# Patient Record
Sex: FEMALE | Race: ASIAN | NOT HISPANIC OR LATINO | URBAN - METROPOLITAN AREA
[De-identification: names, ages, dates, MRNs, and addresses within clinical notes are randomized per-mention and may not be internally consistent; named-entity substitution may affect disease eponyms.]

---

## 2018-04-05 ENCOUNTER — OFFICE VISIT - HEALTHEAST (OUTPATIENT)
Dept: FAMILY MEDICINE | Facility: CLINIC | Age: 73
End: 2018-04-05

## 2018-04-05 DIAGNOSIS — R60.0 LOWER EXTREMITY EDEMA: ICD-10-CM

## 2018-04-05 DIAGNOSIS — T78.40XD ALLERGIC REACTION TO DRUG, SUBSEQUENT ENCOUNTER: ICD-10-CM

## 2018-07-11 ENCOUNTER — AMBULATORY - HEALTHEAST (OUTPATIENT)
Dept: GERIATRICS | Facility: CLINIC | Age: 73
End: 2018-07-11

## 2018-07-12 ENCOUNTER — OFFICE VISIT - HEALTHEAST (OUTPATIENT)
Dept: GERIATRICS | Facility: CLINIC | Age: 73
End: 2018-07-12

## 2018-07-12 DIAGNOSIS — I10 ESSENTIAL HYPERTENSION: ICD-10-CM

## 2018-07-12 DIAGNOSIS — I48.0 PAROXYSMAL ATRIAL FIBRILLATION (H): ICD-10-CM

## 2018-07-12 DIAGNOSIS — N18.6 ESRD ON HEMODIALYSIS (H): ICD-10-CM

## 2018-07-12 DIAGNOSIS — I63.89 CEREBROVASCULAR ACCIDENT (CVA) DUE TO OTHER MECHANISM (H): ICD-10-CM

## 2018-07-12 DIAGNOSIS — Z99.2 ESRD ON HEMODIALYSIS (H): ICD-10-CM

## 2018-07-13 ENCOUNTER — COMMUNICATION - HEALTHEAST (OUTPATIENT)
Dept: GERIATRICS | Facility: CLINIC | Age: 73
End: 2018-07-13

## 2018-07-16 ENCOUNTER — COMMUNICATION - HEALTHEAST (OUTPATIENT)
Dept: GERIATRICS | Facility: CLINIC | Age: 73
End: 2018-07-16

## 2018-07-17 ENCOUNTER — OFFICE VISIT - HEALTHEAST (OUTPATIENT)
Dept: GERIATRICS | Facility: CLINIC | Age: 73
End: 2018-07-17

## 2018-07-17 DIAGNOSIS — I25.10 CAD (CORONARY ARTERY DISEASE): ICD-10-CM

## 2018-07-17 DIAGNOSIS — Z86.73 STATUS POST CVA: ICD-10-CM

## 2018-07-17 DIAGNOSIS — N18.6 ESRD (END STAGE RENAL DISEASE) (H): ICD-10-CM

## 2018-07-18 ENCOUNTER — AMBULATORY - HEALTHEAST (OUTPATIENT)
Dept: GERIATRICS | Facility: CLINIC | Age: 73
End: 2018-07-18

## 2018-07-19 ENCOUNTER — OFFICE VISIT - HEALTHEAST (OUTPATIENT)
Dept: GERIATRICS | Facility: CLINIC | Age: 73
End: 2018-07-19

## 2018-07-19 DIAGNOSIS — Z86.73 STATUS POST CVA: ICD-10-CM

## 2018-07-19 DIAGNOSIS — N18.6 ESRD (END STAGE RENAL DISEASE) (H): ICD-10-CM

## 2018-07-19 DIAGNOSIS — I25.10 CAD (CORONARY ARTERY DISEASE): ICD-10-CM

## 2018-07-24 ENCOUNTER — OFFICE VISIT - HEALTHEAST (OUTPATIENT)
Dept: GERIATRICS | Facility: CLINIC | Age: 73
End: 2018-07-24

## 2018-07-24 DIAGNOSIS — Z86.73 STATUS POST CVA: ICD-10-CM

## 2018-07-24 DIAGNOSIS — I25.10 CAD (CORONARY ARTERY DISEASE): ICD-10-CM

## 2018-07-24 DIAGNOSIS — N18.6 ESRD (END STAGE RENAL DISEASE) (H): ICD-10-CM

## 2018-07-26 ENCOUNTER — OFFICE VISIT - HEALTHEAST (OUTPATIENT)
Dept: GERIATRICS | Facility: CLINIC | Age: 73
End: 2018-07-26

## 2018-07-26 DIAGNOSIS — Z86.73 STATUS POST CVA: ICD-10-CM

## 2018-07-26 DIAGNOSIS — N18.6 ESRD (END STAGE RENAL DISEASE) (H): ICD-10-CM

## 2018-07-26 DIAGNOSIS — I10 ESSENTIAL HYPERTENSION: ICD-10-CM

## 2018-07-26 DIAGNOSIS — I25.10 CAD (CORONARY ARTERY DISEASE): ICD-10-CM

## 2018-07-31 ENCOUNTER — OFFICE VISIT - HEALTHEAST (OUTPATIENT)
Dept: GERIATRICS | Facility: CLINIC | Age: 73
End: 2018-07-31

## 2018-07-31 DIAGNOSIS — N18.6 ESRD ON HEMODIALYSIS (H): ICD-10-CM

## 2018-07-31 DIAGNOSIS — Z99.2 ESRD ON HEMODIALYSIS (H): ICD-10-CM

## 2018-07-31 DIAGNOSIS — I10 ESSENTIAL HYPERTENSION: ICD-10-CM

## 2018-07-31 DIAGNOSIS — I25.10 CAD (CORONARY ARTERY DISEASE): ICD-10-CM

## 2018-07-31 DIAGNOSIS — Z86.73 STATUS POST CVA: ICD-10-CM

## 2018-07-31 DIAGNOSIS — N18.6 ESRD (END STAGE RENAL DISEASE) (H): ICD-10-CM

## 2018-08-02 ENCOUNTER — OFFICE VISIT - HEALTHEAST (OUTPATIENT)
Dept: GERIATRICS | Facility: CLINIC | Age: 73
End: 2018-08-02

## 2018-08-02 DIAGNOSIS — I10 ESSENTIAL HYPERTENSION: ICD-10-CM

## 2018-08-02 DIAGNOSIS — Z86.73 STATUS POST CVA: ICD-10-CM

## 2018-08-02 DIAGNOSIS — N18.6 ESRD (END STAGE RENAL DISEASE) (H): ICD-10-CM

## 2018-08-02 DIAGNOSIS — I25.10 CAD (CORONARY ARTERY DISEASE): ICD-10-CM

## 2018-08-03 ENCOUNTER — AMBULATORY - HEALTHEAST (OUTPATIENT)
Dept: GERIATRICS | Facility: CLINIC | Age: 73
End: 2018-08-03

## 2019-02-01 ENCOUNTER — OFFICE VISIT - HEALTHEAST (OUTPATIENT)
Dept: GERIATRICS | Facility: CLINIC | Age: 74
End: 2019-02-01

## 2019-02-01 DIAGNOSIS — I73.9 PAD (PERIPHERAL ARTERY DISEASE) (H): ICD-10-CM

## 2019-02-01 DIAGNOSIS — I48.0 PAROXYSMAL ATRIAL FIBRILLATION (H): ICD-10-CM

## 2019-02-01 DIAGNOSIS — N18.6 ESRD ON HEMODIALYSIS (H): ICD-10-CM

## 2019-02-01 DIAGNOSIS — E11.29 TYPE 2 DIABETES MELLITUS WITH OTHER DIABETIC KIDNEY COMPLICATION, WITHOUT LONG-TERM CURRENT USE OF INSULIN (H): ICD-10-CM

## 2019-02-01 DIAGNOSIS — Z99.2 ESRD ON HEMODIALYSIS (H): ICD-10-CM

## 2019-02-03 ENCOUNTER — AMBULATORY - HEALTHEAST (OUTPATIENT)
Dept: ADMINISTRATIVE | Facility: CLINIC | Age: 74
End: 2019-02-03

## 2019-02-03 RX ORDER — POLYETHYLENE GLYCOL 3350 17 G/17G
17 POWDER, FOR SOLUTION ORAL DAILY PRN
Status: SHIPPED | COMMUNITY
Start: 2019-02-03

## 2019-02-03 RX ORDER — CLOPIDOGREL BISULFATE 75 MG/1
75 TABLET ORAL DAILY
Status: SHIPPED | COMMUNITY
Start: 2019-02-03

## 2019-02-03 RX ORDER — SEVELAMER CARBONATE 800 MG/1
800 TABLET, FILM COATED ORAL
Status: SHIPPED | COMMUNITY
Start: 2019-02-03

## 2019-02-03 RX ORDER — ACETAMINOPHEN 325 MG/1
650 TABLET ORAL EVERY 6 HOURS PRN
Status: SHIPPED | COMMUNITY
Start: 2019-02-03

## 2019-02-05 ENCOUNTER — OFFICE VISIT - HEALTHEAST (OUTPATIENT)
Dept: GERIATRICS | Facility: CLINIC | Age: 74
End: 2019-02-05

## 2019-02-05 DIAGNOSIS — I25.10 CORONARY ARTERY DISEASE DUE TO LIPID RICH PLAQUE: ICD-10-CM

## 2019-02-05 DIAGNOSIS — I25.83 CORONARY ARTERY DISEASE DUE TO LIPID RICH PLAQUE: ICD-10-CM

## 2019-02-05 DIAGNOSIS — Z99.2 ESRD ON HEMODIALYSIS (H): ICD-10-CM

## 2019-02-05 DIAGNOSIS — I73.9 PAD (PERIPHERAL ARTERY DISEASE) (H): ICD-10-CM

## 2019-02-05 DIAGNOSIS — N18.6 ESRD ON HEMODIALYSIS (H): ICD-10-CM

## 2019-02-06 ENCOUNTER — OFFICE VISIT - HEALTHEAST (OUTPATIENT)
Dept: GERIATRICS | Facility: CLINIC | Age: 74
End: 2019-02-06

## 2019-02-06 DIAGNOSIS — I73.9 PAD (PERIPHERAL ARTERY DISEASE) (H): ICD-10-CM

## 2019-02-06 DIAGNOSIS — N18.6 ESRD ON HEMODIALYSIS (H): ICD-10-CM

## 2019-02-06 DIAGNOSIS — Z99.2 ESRD ON HEMODIALYSIS (H): ICD-10-CM

## 2019-02-06 DIAGNOSIS — I48.0 PAROXYSMAL ATRIAL FIBRILLATION (H): ICD-10-CM

## 2019-02-07 ENCOUNTER — OFFICE VISIT - HEALTHEAST (OUTPATIENT)
Dept: GERIATRICS | Facility: CLINIC | Age: 74
End: 2019-02-07

## 2019-02-07 DIAGNOSIS — I10 ESSENTIAL HYPERTENSION: ICD-10-CM

## 2019-02-07 DIAGNOSIS — I73.9 PAD (PERIPHERAL ARTERY DISEASE) (H): ICD-10-CM

## 2019-02-07 DIAGNOSIS — N18.6 ESRD (END STAGE RENAL DISEASE) (H): ICD-10-CM

## 2019-02-08 ENCOUNTER — OFFICE VISIT - HEALTHEAST (OUTPATIENT)
Dept: GERIATRICS | Facility: CLINIC | Age: 74
End: 2019-02-08

## 2019-02-08 DIAGNOSIS — I48.0 PAROXYSMAL ATRIAL FIBRILLATION (H): ICD-10-CM

## 2019-02-11 ENCOUNTER — OFFICE VISIT - HEALTHEAST (OUTPATIENT)
Dept: GERIATRICS | Facility: CLINIC | Age: 74
End: 2019-02-11

## 2019-02-11 DIAGNOSIS — I73.9 PAD (PERIPHERAL ARTERY DISEASE) (H): ICD-10-CM

## 2019-02-11 DIAGNOSIS — R11.0 NAUSEA: ICD-10-CM

## 2019-02-11 DIAGNOSIS — I48.0 PAROXYSMAL ATRIAL FIBRILLATION (H): ICD-10-CM

## 2019-02-12 ENCOUNTER — OFFICE VISIT - HEALTHEAST (OUTPATIENT)
Dept: GERIATRICS | Facility: CLINIC | Age: 74
End: 2019-02-12

## 2019-02-12 DIAGNOSIS — I99.8 ISCHEMIA OF FOOT: ICD-10-CM

## 2019-02-12 DIAGNOSIS — N18.6 ESRD (END STAGE RENAL DISEASE) (H): ICD-10-CM

## 2019-02-12 DIAGNOSIS — R11.0 NAUSEA: ICD-10-CM

## 2019-02-15 ENCOUNTER — OFFICE VISIT - HEALTHEAST (OUTPATIENT)
Dept: GERIATRICS | Facility: CLINIC | Age: 74
End: 2019-02-15

## 2019-02-15 DIAGNOSIS — I48.0 PAROXYSMAL ATRIAL FIBRILLATION (H): ICD-10-CM

## 2019-02-15 DIAGNOSIS — I73.9 PAD (PERIPHERAL ARTERY DISEASE) (H): ICD-10-CM

## 2019-02-19 ENCOUNTER — OFFICE VISIT - HEALTHEAST (OUTPATIENT)
Dept: GERIATRICS | Facility: CLINIC | Age: 74
End: 2019-02-19

## 2019-02-19 DIAGNOSIS — Z99.2 ESRD ON HEMODIALYSIS (H): ICD-10-CM

## 2019-02-19 DIAGNOSIS — I73.9 PAD (PERIPHERAL ARTERY DISEASE) (H): ICD-10-CM

## 2019-02-19 DIAGNOSIS — I48.0 PAROXYSMAL ATRIAL FIBRILLATION (H): ICD-10-CM

## 2019-02-19 DIAGNOSIS — N18.6 ESRD ON HEMODIALYSIS (H): ICD-10-CM

## 2019-02-19 DIAGNOSIS — I99.8 ISCHEMIA OF FOOT: ICD-10-CM

## 2019-02-21 ENCOUNTER — OFFICE VISIT - HEALTHEAST (OUTPATIENT)
Dept: GERIATRICS | Facility: CLINIC | Age: 74
End: 2019-02-21

## 2019-02-21 DIAGNOSIS — N18.6 ESRD ON HEMODIALYSIS (H): ICD-10-CM

## 2019-02-21 DIAGNOSIS — Z99.2 ESRD ON HEMODIALYSIS (H): ICD-10-CM

## 2019-02-21 DIAGNOSIS — I99.8 ISCHEMIA OF FOOT: ICD-10-CM

## 2019-02-21 DIAGNOSIS — I48.0 PAROXYSMAL ATRIAL FIBRILLATION (H): ICD-10-CM

## 2019-02-22 ENCOUNTER — OFFICE VISIT - HEALTHEAST (OUTPATIENT)
Dept: GERIATRICS | Facility: CLINIC | Age: 74
End: 2019-02-22

## 2019-02-22 DIAGNOSIS — I73.9 PAD (PERIPHERAL ARTERY DISEASE) (H): ICD-10-CM

## 2019-02-22 DIAGNOSIS — I48.0 PAROXYSMAL ATRIAL FIBRILLATION (H): ICD-10-CM

## 2019-02-26 ENCOUNTER — OFFICE VISIT - HEALTHEAST (OUTPATIENT)
Dept: GERIATRICS | Facility: CLINIC | Age: 74
End: 2019-02-26

## 2019-02-26 DIAGNOSIS — I48.0 PAROXYSMAL ATRIAL FIBRILLATION (H): ICD-10-CM

## 2019-02-26 DIAGNOSIS — N18.6 ESRD (END STAGE RENAL DISEASE) (H): ICD-10-CM

## 2019-02-26 DIAGNOSIS — I73.9 PAD (PERIPHERAL ARTERY DISEASE) (H): ICD-10-CM

## 2019-02-28 ENCOUNTER — OFFICE VISIT - HEALTHEAST (OUTPATIENT)
Dept: GERIATRICS | Facility: CLINIC | Age: 74
End: 2019-02-28

## 2019-02-28 DIAGNOSIS — N18.6 ESRD ON HEMODIALYSIS (H): ICD-10-CM

## 2019-02-28 DIAGNOSIS — I48.0 PAROXYSMAL ATRIAL FIBRILLATION (H): ICD-10-CM

## 2019-02-28 DIAGNOSIS — I99.8 ISCHEMIA OF FOOT: ICD-10-CM

## 2019-02-28 DIAGNOSIS — Z99.2 ESRD ON HEMODIALYSIS (H): ICD-10-CM

## 2019-03-05 ENCOUNTER — OFFICE VISIT - HEALTHEAST (OUTPATIENT)
Dept: GERIATRICS | Facility: CLINIC | Age: 74
End: 2019-03-05

## 2019-03-05 DIAGNOSIS — I73.9 PAD (PERIPHERAL ARTERY DISEASE) (H): ICD-10-CM

## 2019-03-05 DIAGNOSIS — I48.0 PAROXYSMAL ATRIAL FIBRILLATION (H): ICD-10-CM

## 2019-03-05 DIAGNOSIS — I25.83 CORONARY ARTERY DISEASE DUE TO LIPID RICH PLAQUE: ICD-10-CM

## 2019-03-05 DIAGNOSIS — I25.10 CORONARY ARTERY DISEASE DUE TO LIPID RICH PLAQUE: ICD-10-CM

## 2019-03-05 DIAGNOSIS — N18.6 ESRD (END STAGE RENAL DISEASE) (H): ICD-10-CM

## 2019-03-07 ENCOUNTER — OFFICE VISIT - HEALTHEAST (OUTPATIENT)
Dept: GERIATRICS | Facility: CLINIC | Age: 74
End: 2019-03-07

## 2019-03-07 DIAGNOSIS — I25.83 CORONARY ARTERY DISEASE DUE TO LIPID RICH PLAQUE: ICD-10-CM

## 2019-03-07 DIAGNOSIS — N18.6 ESRD ON HEMODIALYSIS (H): ICD-10-CM

## 2019-03-07 DIAGNOSIS — I25.10 CORONARY ARTERY DISEASE DUE TO LIPID RICH PLAQUE: ICD-10-CM

## 2019-03-07 DIAGNOSIS — Z99.2 ESRD ON HEMODIALYSIS (H): ICD-10-CM

## 2019-03-07 DIAGNOSIS — I73.9 PAD (PERIPHERAL ARTERY DISEASE) (H): ICD-10-CM

## 2019-03-08 ENCOUNTER — OFFICE VISIT - HEALTHEAST (OUTPATIENT)
Dept: GERIATRICS | Facility: CLINIC | Age: 74
End: 2019-03-08

## 2019-03-08 DIAGNOSIS — N18.6 ESRD ON HEMODIALYSIS (H): ICD-10-CM

## 2019-03-08 DIAGNOSIS — I73.9 PAD (PERIPHERAL ARTERY DISEASE) (H): ICD-10-CM

## 2019-03-08 DIAGNOSIS — I48.0 PAROXYSMAL ATRIAL FIBRILLATION (H): ICD-10-CM

## 2019-03-08 DIAGNOSIS — Z99.2 ESRD ON HEMODIALYSIS (H): ICD-10-CM

## 2019-03-12 ENCOUNTER — OFFICE VISIT - HEALTHEAST (OUTPATIENT)
Dept: GERIATRICS | Facility: CLINIC | Age: 74
End: 2019-03-12

## 2019-03-12 DIAGNOSIS — Z99.2 ESRD ON HEMODIALYSIS (H): ICD-10-CM

## 2019-03-12 DIAGNOSIS — I48.0 PAROXYSMAL ATRIAL FIBRILLATION (H): ICD-10-CM

## 2019-03-12 DIAGNOSIS — I73.9 PAD (PERIPHERAL ARTERY DISEASE) (H): ICD-10-CM

## 2019-03-12 DIAGNOSIS — N18.6 ESRD ON HEMODIALYSIS (H): ICD-10-CM

## 2019-03-13 ENCOUNTER — OFFICE VISIT - HEALTHEAST (OUTPATIENT)
Dept: GERIATRICS | Facility: CLINIC | Age: 74
End: 2019-03-13

## 2019-03-13 DIAGNOSIS — I73.9 PAD (PERIPHERAL ARTERY DISEASE) (H): ICD-10-CM

## 2019-03-13 DIAGNOSIS — I10 ESSENTIAL HYPERTENSION: ICD-10-CM

## 2019-03-13 DIAGNOSIS — I48.0 PAROXYSMAL ATRIAL FIBRILLATION (H): ICD-10-CM

## 2019-03-14 ENCOUNTER — OFFICE VISIT - HEALTHEAST (OUTPATIENT)
Dept: GERIATRICS | Facility: CLINIC | Age: 74
End: 2019-03-14

## 2019-03-14 DIAGNOSIS — I73.9 PAD (PERIPHERAL ARTERY DISEASE) (H): ICD-10-CM

## 2019-03-14 DIAGNOSIS — I99.8 ISCHEMIA OF FOOT: ICD-10-CM

## 2019-03-14 DIAGNOSIS — N18.6 ESRD ON HEMODIALYSIS (H): ICD-10-CM

## 2019-03-14 DIAGNOSIS — Z99.2 ESRD ON HEMODIALYSIS (H): ICD-10-CM

## 2019-03-15 ENCOUNTER — OFFICE VISIT - HEALTHEAST (OUTPATIENT)
Dept: GERIATRICS | Facility: CLINIC | Age: 74
End: 2019-03-15

## 2019-03-15 DIAGNOSIS — I48.0 PAROXYSMAL ATRIAL FIBRILLATION (H): ICD-10-CM

## 2019-03-15 DIAGNOSIS — I73.9 PAD (PERIPHERAL ARTERY DISEASE) (H): ICD-10-CM

## 2019-03-15 DIAGNOSIS — R11.0 NAUSEA: ICD-10-CM

## 2019-03-18 ENCOUNTER — OFFICE VISIT - HEALTHEAST (OUTPATIENT)
Dept: GERIATRICS | Facility: CLINIC | Age: 74
End: 2019-03-18

## 2019-03-18 DIAGNOSIS — I73.9 PAD (PERIPHERAL ARTERY DISEASE) (H): ICD-10-CM

## 2019-03-18 DIAGNOSIS — I48.0 PAROXYSMAL ATRIAL FIBRILLATION (H): ICD-10-CM

## 2019-03-19 ENCOUNTER — OFFICE VISIT - HEALTHEAST (OUTPATIENT)
Dept: GERIATRICS | Facility: CLINIC | Age: 74
End: 2019-03-19

## 2019-03-19 DIAGNOSIS — I99.8 ISCHEMIA OF FOOT: ICD-10-CM

## 2019-03-19 DIAGNOSIS — I25.10 CORONARY ARTERY DISEASE DUE TO LIPID RICH PLAQUE: ICD-10-CM

## 2019-03-19 DIAGNOSIS — I73.9 PAD (PERIPHERAL ARTERY DISEASE) (H): ICD-10-CM

## 2019-03-19 DIAGNOSIS — I25.83 CORONARY ARTERY DISEASE DUE TO LIPID RICH PLAQUE: ICD-10-CM

## 2019-03-21 ENCOUNTER — OFFICE VISIT - HEALTHEAST (OUTPATIENT)
Dept: GERIATRICS | Facility: CLINIC | Age: 74
End: 2019-03-21

## 2019-03-21 DIAGNOSIS — I99.8 ISCHEMIA OF FOOT: ICD-10-CM

## 2019-03-21 DIAGNOSIS — I73.9 PAD (PERIPHERAL ARTERY DISEASE) (H): ICD-10-CM

## 2019-03-21 DIAGNOSIS — R11.0 NAUSEA: ICD-10-CM

## 2019-03-22 ENCOUNTER — OFFICE VISIT - HEALTHEAST (OUTPATIENT)
Dept: GERIATRICS | Facility: CLINIC | Age: 74
End: 2019-03-22

## 2019-03-22 DIAGNOSIS — I48.0 PAROXYSMAL ATRIAL FIBRILLATION (H): ICD-10-CM

## 2019-03-26 ENCOUNTER — OFFICE VISIT - HEALTHEAST (OUTPATIENT)
Dept: GERIATRICS | Facility: CLINIC | Age: 74
End: 2019-03-26

## 2019-03-26 DIAGNOSIS — I10 ESSENTIAL HYPERTENSION: ICD-10-CM

## 2019-03-26 DIAGNOSIS — I25.83 CORONARY ARTERY DISEASE DUE TO LIPID RICH PLAQUE: ICD-10-CM

## 2019-03-26 DIAGNOSIS — I99.8 ISCHEMIA OF FOOT: ICD-10-CM

## 2019-03-26 DIAGNOSIS — I25.10 CORONARY ARTERY DISEASE DUE TO LIPID RICH PLAQUE: ICD-10-CM

## 2019-03-26 DIAGNOSIS — I73.9 PAD (PERIPHERAL ARTERY DISEASE) (H): ICD-10-CM

## 2019-03-28 ENCOUNTER — OFFICE VISIT - HEALTHEAST (OUTPATIENT)
Dept: GERIATRICS | Facility: CLINIC | Age: 74
End: 2019-03-28

## 2019-03-28 DIAGNOSIS — N18.6 ESRD ON HEMODIALYSIS (H): ICD-10-CM

## 2019-03-28 DIAGNOSIS — I10 ESSENTIAL HYPERTENSION: ICD-10-CM

## 2019-03-28 DIAGNOSIS — Z99.2 ESRD ON HEMODIALYSIS (H): ICD-10-CM

## 2019-03-28 DIAGNOSIS — I25.83 CORONARY ARTERY DISEASE DUE TO LIPID RICH PLAQUE: ICD-10-CM

## 2019-03-28 DIAGNOSIS — I25.10 CORONARY ARTERY DISEASE DUE TO LIPID RICH PLAQUE: ICD-10-CM

## 2019-04-01 ENCOUNTER — AMBULATORY - HEALTHEAST (OUTPATIENT)
Dept: GERIATRICS | Facility: CLINIC | Age: 74
End: 2019-04-01

## 2021-05-27 NOTE — PROGRESS NOTES
[facility]    Facility:   [facility] [[id number]]   Code Status: POLST AVAILABLE    73-year-old female seen for follow up evaluation and discharge planning. History of end-stage renal disease on hemodialysis, coronary artery disease, cerebral vascular disease, hypertension, peripheral vascular disease, admitted to hospital with ischemic changes left lower extremity, critical arterial insufficiency, has declined further surgical intervention for gangrenous changes, has continued on dialysis three times weekly. Family has agreed to take patient home, patient anticipates continuing dialysis. Currently receiving oxycodone 5 mg Q6 hours PRN and for significant pain related to gangrene/ischemia. Recent prolonged nausea and vomiting, currently receiving Zofran 8 mg TID on a routine basis with resolution of nausea and vomiting, no evidence of acute abdomen. Blood pressure  has been stable. Physical therapy discontinued long ago.    Review of systems: patient is seen with  and sister on this occasion. Ongoing pain left toes, mild pain right distal toes. Denies nausea or vomiting. Appetite adequate. Denies chest pain or palpitations. No fever sweats or chills. Remainder of 12 point review of systems obtained negative.  has no concerns related to patient care.    Exam: vital signs reviewed over past seven days. Hmong speaking, sitting on edge of bed in no apparent distress. Mucous membranes moist. No facial asymmetry. S1 and S2 regular. Pulmonary exam without rales rhonchi or wheezes. Abdomen without masses tenderness organomegaly. Periphery with nonpalpable pedal pulses, blackened toes one through four left foot, minimal soft-tissue swelling forefoot, spotty gangrene right toes. No calf tenderness or swelling.    Impression and plan:   critical vascular arterial insufficiency left greater than right lower extremity, gangrene left toes extensive, patient declines surgical  intervention including amputation, family does not desire patient to have amputation or surgical intervention, they have declined hospice evaluation.   Recent prolonged nausea and vomiting on Zofran 8 mg TID.   Hypertension with adequate control.   End-stage renal disease on hemodialysis, tolerating dialysis.   Cerebral vascular disease without recent neurologic events.   Patient will discharge to home in 24 hours, medications are as follows:  Current Outpatient Medications:      acetaminophen (TYLENOL) 325 MG tablet, Take 650 mg by mouth every 6 (six) hours as needed for pain., Disp: , Rfl:      clopidogrel (PLAVIX) 75 mg tablet, Take 75 mg by mouth daily., Disp: , Rfl:      insulin aspart U-100 (NOVOLOG) 100 unit/mL injection, Inject under the skin 3 (three) times a day with meals. Per sliding scale; =no insulin, 150-199=1 u, 200-249=2 u,250-299=3 u, 300-349=4 u, 350-399=5 u, 400 or greater=6 units, call MD, Disp: , Rfl:      ipratropium-albuterol (DUO-NEB) 0.5-2.5 mg/3 mL nebulizer, Inhale 3 mL every 4 (four) hours as needed., Disp: , Rfl:      metoprolol succinate (TOPROL-XL) 50 MG 24 hr tablet, Take 1 tablet (50 mg total) by mouth daily., Disp: , Rfl: 0     midodrine (PROAMATINE) 5 MG tablet, Take 5 mg by mouth 3 (three) times a day.    , Disp: , Rfl:      polyethylene glycol (MIRALAX) 17 gram packet, Take 17 g by mouth daily as needed., Disp: , Rfl:      senna (SENOKOT) 8.6 mg tablet, Take 1 tablet by mouth 2 (two) times a day., Disp: , Rfl:      sevelamer carbonate (RENVELA) 800 mg tablet, Take 800 mg by mouth 3 (three) times a day before meals. And 800 mg prn take with each snack   , Disp: , Rfl:      warfarin sodium (WARFARIN ORAL), Take by mouth See Admin Instructions.    , Disp: , Rfl:  additional medications oxycodone 5 mg Q6 hours PRN, Zofran 8 mg TID . Patient will be homebound and will require registered nurse and physical therapy. Total time of discharge evaluation greater than 30 minutes,  greater than 50% of time spent in coordination of care and counseling, discussion with patient, family members, nursing staff. Follow up will be with regular physician over next two weeks.      Electronically signed by: Millie Farias MD

## 2021-05-27 NOTE — PROGRESS NOTES
[facility]    Facility:   [facility] [[id number]]   Code Status: POLST AVAILABLE    Reassessment of 73-year-old female with gangrenous changes left greater than right foot, critical peripheral arterial disease, has declined surgical intervention, continues in TCU for pain management, management of medical problems which include hypertension, coronary artery disease, cerebral vascular disease, end-stage renal disease on dialysis three times weekly. Recent significant nausea and vomiting, now receiving Zofran 8 mg TID routinely with resolution of symptoms.    Review of systems: limited by Hmong as primary language. Pain foot continues. Tolerating oxycodone. Nausea and vomiting resolved. Denies fever sweats or chills. No anterior chest discomfort. No focal neurologic symptoms. Eating well. Remainder of 12 point review of systems obtained negative.    Social work reports patient will be discharging to home, family has decided they can care for patient in home setting.    Exam: vital signs reviewed over past seven days with satisfactory control of blood pressure. Patient alert, oriented, sitting on edge of bed, states repeatedly she wants to go home. Otherwise in no apparent distress. Mucous membranes moist. Skin turgor intact. No HJR. S1 and S2 regular. Pulmonary exam without rales rhonchi or wheezes. Abdomen without masses tenderness organomegaly. Gangrene extensively left first through fourth toes, dry, trace erythema extending from toes to forefoot, minimal soft-tissue swelling, tender to palpation distal forefoot. Trace ischemic changes right toes. Pedal pulses are nonpalpable.    Impression and plan:   peripheral arterial disease, patient has declined surgery, dry gangrene left toes, pain appears adequately controlled with oxycodone.   Recent prolonged nausea and vomiting without evidence of acute abdomen, continue Zofran 8 mg TID with symptoms controlled.   End-stage renal  disease continuing dialysis, patient has not expressed a desire to discontinue dialysis.   Hypertension with satisfactory control.   Cerebral vascular disease and coronary artery disease without indication of angina or new focal neurologic deficits.   Issues reviewed with patient, nursing staff, social work.      Electronically signed by: Millie Farias MD

## 2021-05-27 NOTE — PROGRESS NOTES
[facility]    Facility:   [facility] [[id number]]   Code Status: POLST AVAILABLE  73-year-old female with prolonged stay in TCU is seen for reevaluation. Original admission to hospital was with atrial fibrillation and RVR, critical ischemia left lower extremity, patient declined surgical intervention, continues in TCU  with gangrene left forefoot/toes, receiving oxycodone for pain management, continues on dialysis for end-stage renal disease, continues anticoagulated for atrial fibrillation. History of hypertension and CVA.    Review of systems: continued nausea, frequency of vomiting has decreased, denies abdominal pain. Pain adequately controlled left foot. No cardiac or pulmonary symptoms. No focal neurologic deficits. Remainder of 12 point review of systems negative but questionably reliable in view of patient's limited English understanding. Nursing staff report patient has increased complaints of nausea, vomiting periodically.    Exam: weight 118.63/19, 126.32/26. Patient appears uncomfortable, food is in place, is not eating. Skin turgor intact. No facial asymmetry. Mucous membranes dry, no pharyngeal erythema. No HJR or cervical adenopathy. S1 and S2 irregular. Pulmonary exam without adventitious sounds. Abdomen without masses tenderness or organomegaly. Periphery without edema, continued blackened toes 2-4 left foot, pedal pulses nonpalpable.    Impression and plan:   critical ischemia left lower extremity, gangrene, no evidence of sepsis.   Persistent nausea and vomiting, Zofran available, no evidence of acute abdomen, continue Zofran as necessary.   Patient has agreed to no further hospitalizations, has declined hospice care, continue to monitor for adequacy of pain control.   End-stage renal disease on hemodialysis, continues dialysis three times weekly.   Diabetes mellitus, adequate control.   Atrial fibrillation with heart rate controlled.   Abnormal weight loss  related to nausea and vomiting and poor appetite, encourage eating as tolerated.   Issues reviewed with patient and nursing staff.         Electronically signed by: Millie Farias MD

## 2021-05-27 NOTE — PROGRESS NOTES
[facility]    Facility:   [facility] [[id number]]   Code Status: POLST AVAILABLE      CHIEF COMPLAINT/REASON FOR VISIT:  Chief Complaint   Patient presents with     Problem Visit     coumadin/INR/pain       HISTORY:      HPI: Graciela is a 73 y.o. female patient with history of diabetes mellitus, hypertension, A. Fib, and end-stage renal disease on hemodialysis; became short of breath and had missed her dialysis appointment because of her foot pain and when she arrived at dialysis for her next appointment she became short of breath with hypoxemia and was transferred via EMS to [facility].  She was in A. fib with RVR but converted to sinus rhythm on her own.  Reportedly scheduled for vascular surgery as an outpatient in 2-4 weeks; she had been previously seen at [facility] 2 weeks prior for foot pain and was treated with antibiotics.    Today: INR 1.7 so will add another 0.5mg dose to the week (4 days a week and none 3 days per week) and recheck on Wednesday. Her INR has been variable as has her po intake. Both nausea and pain are improved per her report today as well as nursing report.     PVD: Possible below ankle amputation; - cancelled per family and patient wishes.    A. fib: On Coumadin.  Also on metoprolol for rate control.    End-stage renal disease: On hemodialysis Monday, Wednesday, Friday.  Does take midodrine with her to appointment to account for hypotension.  On 1500ml  fluid restriction.    DM 2: On sliding scale insulin, blood sugars have been well within normal limits in TCU.        Past Medical History:   Diagnosis Date     Acute hypoxemic respiratory failure (H)      CKD (chronic kidney disease) requiring chronic dialysis (H)      Critical lower limb ischemia      Diabetes mellitus, type II (H)      Essential hypertension      Hyperlipidemia      Influenza A 12/28/2016     PAD (peripheral artery disease) (H)      Paroxysmal atrial fibrillation (H) 12/28/2016      Pericardial cyst 2013     Sepsis (H)              Family History   Problem Relation Age of Onset     Acute Myocardial Infarction Neg Hx      Sudden death Neg Hx      Social History     Socioeconomic History     Marital status:      Spouse name: Not on file     Number of children: 9     Years of education: Not on file     Highest education level: Not on file   Occupational History     Not on file   Social Needs     Financial resource strain: Not on file     Food insecurity:     Worry: Not on file     Inability: Not on file     Transportation needs:     Medical: Not on file     Non-medical: Not on file   Tobacco Use     Smoking status: Never Smoker     Smokeless tobacco: Never Used   Substance and Sexual Activity     Alcohol use: No     Drug use: No     Sexual activity: No   Lifestyle     Physical activity:     Days per week: Not on file     Minutes per session: Not on file     Stress: Not on file   Relationships     Social connections:     Talks on phone: Not on file     Gets together: Not on file     Attends [employer] service: Not on file     Active member of club or organization: Not on file     Attends meetings of clubs or organizations: Not on file     Relationship status: Not on file     Intimate partner violence:     Fear of current or ex partner: Not on file     Emotionally abused: Not on file     Physically abused: Not on file     Forced sexual activity: Not on file   Other Topics Concern     Not on file   Social History Narrative     Not on file         Review of Systems   Constitutional: Positive for appetite change.   HENT: Negative.    Respiratory: Negative.    Cardiovascular: Negative.    Gastrointestinal: Positive for nausea. Negative for vomiting.   Genitourinary: Negative.    Musculoskeletal: Negative.         Vascular foot pain   Skin: Positive for color change.   Psychiatric/Behavioral: Negative.    per nursing.   Able to answer some yes or no questions, per nursing main concern is painful  bilateral lower extremities with darkened skin. Very painful.   .  Vitals:    03/26/19 1459   BP: 138/78   Pulse: 70   Temp: 98  F (36.7  C)   SpO2: 98%   Weight: 115 lb (52.2 kg)       Physical Exam   Constitutional: She is easily aroused.   Cardiovascular: Normal rate.   Pulmonary/Chest: Effort normal and breath sounds normal. No respiratory distress.   Abdominal: She exhibits no distension.   Musculoskeletal: She exhibits no edema.   Neurological: She is alert and easily aroused.   Skin: Skin is warm and dry.   Bottom of feet and toes black, tender, cool.          LABS:   To be completed at dialysis.     ASSESSMENT:      ICD-10-CM    1. Paroxysmal atrial fibrillation (H) I48.0        PLAN:    INR 1.7, give 0.5mg 4 days per week and none 3 days per week; recheck Wednesday.   Now on twice daily dosing for oxycodone for foot pain.   Nausea and pain both in control during this visit. Oxycodone scheduled has helped.       Electronically signed by: Julieta Stewart CNP

## 2021-05-27 NOTE — PROGRESS NOTES
[facility]    Facility:   [facility] [[id number]]   Code Status: POLST AVAILABLE      Reassessment of 73-year-old female with gangrene left toes, severe peripheral arterial disease, has declined surgical intervention including amputation, continues in TCU for management of chronic medical problems which include end-stage renal disease on hemodialysis, hypertension, diabetes mellitus, atrial fibrillation anticoagulated. Recent significant nausea and vomiting reported by nursing staff.    Review of systems: patient with limited English ability,  in attendance, describes nausea much of the time. Zofran moderately beneficial, vomits frequently. Denies abdominal pain. No aspiration. No dyspeptic symptoms. Unaware of other family members with nausea or vomiting, denies unusual intake of foods or products. Fatigue present at all times. Desires to go home. No focal neurologic deficits. Reports pain of foot is adequately controlled. 12 point review of systems is otherwise negative but questionably reliable in view of limited English ability. Nursing staff report patient has repeated episodes of vomiting, Zofran appears beneficial.    Exam: lying in bed, communicates with hand gestures and with minimal English ability. Skin turgor intact. No facial asymmetry. No HJR. S1 and S2 irregular. Pulmonary exam without rales rhonchi or wheezes. No hand drift or tremor. Abdomen with normoactive bowel sounds, no organomegaly, no focal tenderness or withdrawal. Gangrenous changes left second through fourth toes with blackness, previous erythema extending from toes resolved. Pedal pulses nonpalpable. No calf tenderness or swelling.    Impression and plan:   gangrene left foot, ischemic changes right foot minimal, patient has declined surgical intervention, receiving oxycodone for pain management, pain control appears adequate but assessment is limited by English limitation.   New onset nausea  and vomiting, no evidence of acute abdomen, no evidence of acute G.I. distress or sepsis, continue Zofran which is scheduled routinely due to persistent symptoms.   Hypertension with adequate control of blood pressure.   End-stage renal disease, nursing unclear if patient is participating fully in sessions, previously has ended sessions prematurely.   Diabetes mellitus with adequate control.   Issues reviewed with patient nursing staff and  in attendance.  Electronically signed by: Millie Farias MD

## 2021-05-31 ENCOUNTER — RECORDS - HEALTHEAST (OUTPATIENT)
Dept: ADMINISTRATIVE | Facility: CLINIC | Age: 76
End: 2021-05-31

## 2021-06-01 VITALS — WEIGHT: 164 LBS

## 2021-06-02 ENCOUNTER — RECORDS - HEALTHEAST (OUTPATIENT)
Dept: ADMINISTRATIVE | Facility: CLINIC | Age: 76
End: 2021-06-02

## 2021-06-02 VITALS — WEIGHT: 129 LBS | BODY MASS INDEX: 25.19 KG/M2

## 2021-06-02 VITALS — WEIGHT: 118 LBS | BODY MASS INDEX: 23.05 KG/M2

## 2021-06-02 VITALS — BODY MASS INDEX: 24.61 KG/M2 | WEIGHT: 126 LBS

## 2021-06-02 VITALS — BODY MASS INDEX: 22.46 KG/M2 | WEIGHT: 115 LBS

## 2021-06-02 VITALS — WEIGHT: 119 LBS | BODY MASS INDEX: 23.24 KG/M2

## 2021-06-02 VITALS — WEIGHT: 127 LBS | BODY MASS INDEX: 24.8 KG/M2

## 2021-06-02 VITALS — WEIGHT: 128 LBS | BODY MASS INDEX: 25 KG/M2

## 2021-06-02 VITALS — BODY MASS INDEX: 25.19 KG/M2 | WEIGHT: 129 LBS

## 2021-06-02 VITALS — BODY MASS INDEX: 23.05 KG/M2 | WEIGHT: 118 LBS

## 2021-06-03 ENCOUNTER — RECORDS - HEALTHEAST (OUTPATIENT)
Dept: ADMINISTRATIVE | Facility: CLINIC | Age: 76
End: 2021-06-03

## 2021-06-16 PROBLEM — I73.9 PAD (PERIPHERAL ARTERY DISEASE) (H): Status: ACTIVE | Noted: 2018-12-31

## 2021-06-16 PROBLEM — L03.116 CELLULITIS OF LEFT FOOT: Status: ACTIVE | Noted: 2018-12-29

## 2021-06-16 PROBLEM — E78.5 DYSLIPIDEMIA: Chronic | Status: ACTIVE | Noted: 2018-07-03

## 2021-06-16 PROBLEM — R11.0 NAUSEA: Status: ACTIVE | Noted: 2019-02-14

## 2021-06-16 PROBLEM — I63.9 STROKE (H): Status: ACTIVE | Noted: 2018-07-02

## 2021-06-17 NOTE — PROGRESS NOTES
Assessment:       Allergic reaction to a drug  Lower extremity edema      Plan:       Short course of oral steroid per orders  Calamine lotion  OTC hydrocortisone 1% cream discussed  Elevation, compression, cold compresses, and tylenol  Discussed signs of worsening symptoms and when to follow-up with PCP if no symptom improvement.  Discussed patient symptoms and appropriate plan with attended physician Dr. Celestin who agreed with plan.     Patient Instructions   You were seen today for hives, likely due to an allergic reaction. These symptoms are generally benign and will improve with conservative management. Keep a watch for worsening symptoms listed below.     Symptom management:  - Continue to take 24-hour antihistamine once a day while symptoms last  - Cold compresses for 10-15 minutes at a time, up to every hour as needed for swelling and itchiness  - Take oral steroid as instructed  - Calamine lotion may be applied to skin to help with itchiness  - May try hydrocortisone cream 1% if itchiness continues despite other treatments    Reasons to be seen for immediately in the emergency room:  - Fever of 100.4  - Tongue or lips swelling  - Difficulty breathing or swallowing  - Feeling tightness in the throat or chest  - Develop abdominal pain  - Swelling in the leg worsens or spreading redness    Otherwise, if no improvement in symptoms in 3 days, follow-up with the primary care provider       Subjective:        History provided by son.  Graciela Valdivia is a 72 y.o. female here for evaluation of a rash. Symptoms have been present for 1 week and started after the patient applied an unknown topical cream from the flea market to her left foot for foot pain. Patient also attempted to puncture her foot several times with a needle before applying the cream. The rash is located on the left lower extremity. Since then it has spread to the right leg, abdomen, chest, and upper extremities. Patient has tried over the counter loratadine  and steroid topical spray for initial treatment and the rash has not changed. Discomfort is moderate, described as itchy. Patient does not have a fever. Recent illnesses: none. Sick contacts: none known. New recent exposures to food, detergent, soaps: none.     Review of Systems  Pertinent items are noted in HPI    Allergies  No Known Allergies       Objective:       /60  Pulse 76  Temp 98  F (36.7  C) (Oral)   Resp 15  Wt 164 lb (74.4 kg)  SpO2 100%  Breastfeeding? No  BMI 30 kg/m2  General appearance: alert, appears stated age, cooperative, no distress and non-toxic  Extremities: mild edema and tenderness to palpation of left foot and ankle; no trauma  Pulses: 2+ and symmetric  Skin: slightly raised, light, maculopapular rash/hives affecting the legs, abdomen, chest, and arms; normal temperature to palpation; skin intact, no drainage. Left foot is mildly edematous, tender to palpation, and violaceous.

## 2021-06-17 NOTE — PATIENT INSTRUCTIONS - HE
Patient Instructions by Julieta Stewart CNP at 2/15/2019  4:10 PM     Author: Julieta Stewart CNP Service: -- Author Type: Nurse Practitioner    Filed: 2/19/2019 10:28 AM Encounter Date: 2/15/2019 Status: Signed    : Julieta Stewart CNP (Nurse Practitioner)

## 2021-06-18 NOTE — LETTER
Letter by Julieta Stewart CNP at      Author: Julieta Stewart CNP Service: -- Author Type: --    Filed:  Encounter Date: 2/1/2019 Status: (Other)         Patient: Graciela Valdivia   MR Number: [id number]   YOB: 1945   Date of Visit: 2/1/2019     [facility]    Facility:   [facility] [[id number]]   Code Status: POLST AVAILABLE      CHIEF COMPLAINT/REASON FOR VISIT:  Chief Complaint   Patient presents with   ? Review Of Multiple Medical Conditions       HISTORY:      HPI: Graciela is a 73 y.o. female patient with history of diabetes mellitus, hypertension, A. Fib, and end-stage renal disease on hemodialysis; became short of breath and had missed her dialysis appointment because of her foot pain and when she arrived at dialysis for her next appointment she became short of breath with hypoxemia and was transferred via EMS to [facility].  She was in A. fib with RVR but converted to sinus rhythm on her own.  Reportedly scheduled for vascular surgery as an outpatient in 2-4 weeks; she had been previously seen at [facility] 2 weeks prior for foot pain and was treated with antibiotics.    PVD: To be following up with vascular to 4 weeks for outpatient procedure.    A. fib: On Coumadin, most recent INR therapeutic.  Also on metoprolol for rate control.    End-stage renal disease: On hemodialysis Monday, Wednesday, Friday.  Does take midodrine with her to appointment to account for hypertension.  On 1500ml  fluid restriction.    DM 2: On sliding scale insulin, blood sugars have been well within normal limits in TCU.        Past Medical History:   Diagnosis Date   ? Acute hypoxemic respiratory failure (H)    ? CKD (chronic kidney disease) requiring chronic dialysis (H)    ? Critical lower limb ischemia    ? Diabetes mellitus, type II (H)    ? Essential hypertension    ? Hyperlipidemia    ? Influenza A 12/28/2016   ? PAD (peripheral artery disease) (H)    ? Paroxysmal atrial  fibrillation (H) 12/28/2016   ? Pericardial cyst 2013   ? Sepsis (H)              Family History   Problem Relation Age of Onset   ? Acute Myocardial Infarction Neg Hx    ? Sudden death Neg Hx      Social History     Socioeconomic History   ? Marital status:      Spouse name: Not on file   ? Number of children: 9   ? Years of education: Not on file   ? Highest education level: Not on file   Social Needs   ? Financial resource strain: Not on file   ? Food insecurity - worry: Not on file   ? Food insecurity - inability: Not on file   ? Transportation needs - medical: Not on file   ? Transportation needs - non-medical: Not on file   Occupational History   ? Not on file   Tobacco Use   ? Smoking status: Never Smoker   ? Smokeless tobacco: Never Used   Substance and Sexual Activity   ? Alcohol use: No   ? Drug use: No   ? Sexual activity: No   Other Topics Concern   ? Not on file   Social History Narrative   ? Not on file         Review of Systems   Unable to perform ROS: Patient nonverbal   Patient refused ROS, hold her head over her head turned away.    .  Vitals:    02/05/19 0909   BP: 138/62   Pulse: 61   Temp: 98.2  F (36.8  C)   SpO2: 100%   Weight: 127 lb (57.6 kg)       Physical Exam   Constitutional: She appears cachectic. She is uncooperative. She is easily aroused.   Cardiovascular: Normal rate.   Pulmonary/Chest: Effort normal and breath sounds normal. No respiratory distress.   Abdominal: She exhibits no distension.   Musculoskeletal: She exhibits no edema.   Neurological: She is alert and easily aroused.   Skin: Skin is warm and dry.         LABS:   To be completed at dialysis.     ASSESSMENT:      ICD-10-CM    1. ESRD on hemodialysis (H) N18.6     Z99.2    2. PAD (peripheral artery disease) (H) I73.9    3. Paroxysmal atrial fibrillation (H) I48.0    4. Type 2 diabetes mellitus with other diabetic kidney complication, without long-term current use of insulin (H) E11.29        PLAN:    Continue  current orders, no changes to care plan made today.  PT/OT as patient allows.    Admission history and physical per MD in the next week. At this time continue current care plan for all chronic medical conditions, as they are stable. Encouraged patient to engage in PT/OT for strengthening and conditioning. Encouraged patient to work closely with nursing staff to ensure any medical complaints are quickly addressed. Follow up this week or sooner if needed. Will continue to monitor patient and work with nursing staff collaboratively to work toward positive patient outcomes.      Total 25 minutes of which 50% was spent counseling and coordination of care of the above plan with nursing.    Electronically signed by: Julieta Stewart, KATHY

## 2021-06-18 NOTE — LETTER
Letter by Millie Farias MD at      Author: Millie Farias MD Service: -- Author Type: --    Filed:  Encounter Date: 2/12/2019 Status: (Other)         Patient: Graciela Valdivia   MR Number: [id number]   YOB: 1945   Date of Visit: 2/12/2019     [facility]    Facility:   [facility] [[id number]]   Code Status: POLST AVAILABLE       Reassessment of 73-year-old female with end-stage renal disease on hemodialysis, severe ischemia bilateral feet, painful gangrenous areas, admitted to hospital with weakness and pain in feet, had been treated as outpatient for cellulitis, per patient report she declined amputation of the feet, transferred to TCU for rehabilitation, recent initiation of oxycodone for severe pain lower extremities.    Review of systems: patient is seen with her , both patient and  have limited English ability. Cooperative and pleasant, appears uncomfortable. No facial asymmetry. No HJR. S1 and S2 regular. Pulmonary exam without adventitious sounds. Abdomen without masses or tenderness. Right forefoot with gangrenous changes toes, left forefoot from mid section with discoloration, gangrenous changes toes. No calf tenderness or swelling. Exquisite tenderness to touch.    Impression and plan: ischemia bilateral feet, progressive gangrenous changes, pain continues symptomatic, oxycodone beneficial, ultrasound and vascular evaluation today. End-stage renal disease on hemodialysis, tolerating dialysis. Intermittent nausea per nursing report, Zofran available PRN. Recent loose stools resolved, no evidence of acute abdomen, obtain C. difficile should loose stooling resume. Medications reviewed, discussion with nursing.        Electronically signed by: iMllie Farias MD

## 2021-06-18 NOTE — LETTER
Letter by Millie Farias MD at      Author: Millie Farias MD Service: -- Author Type: --    Filed:  Encounter Date: 2019 Status: (Other)         Patient: Graciela Valdivia   MR Number: [id number]   YOB: 1945   Date of Visit: 2019     [facility]    Facility:   [facility] [[id number]]   Code Status: POLST AVAILABLE    Admission recent TCU 73-year-old female. History is taken from hospital notes and from prior knowledge of patient, patient is able to give a limited history, Hmong speaking. Presented to hospital with two-week progressive discomfort bilateral forefeet and toes, treated as outpatient with gabapentin and antibiotic with suspicion of cellulitis, antibiotics discontinued on hospitalization, ultrasound negative for DVT, presumed to have ischemic changes from PAD, per patient report she was told she may need an amputation, declined amputation, transferred to TCU for rehabilitation, management of multiple medical problems. Per PGY2   discharge summary patient's pain improved with renal dialysis and decrease in LE edema.    Past medical history, current medical problem list, drug allergies, current medication list, social history ( non-smoker nondrinker ) reviewed in epic. Code status do not intubate. Family history, patient unaware of what her parents  of, other family members have diabetes mellitus.    Review of systems: significant pain bilateral forefoot region and toes. Pain increasing. Denies chest pain or palpitations. Fatigue present at all times. No active G.I. or  symptoms. Unaware of similar foot pain in past. No new focal neurologic deficits. Remainder of 12 point review of systems obtained negative.    Exam: vital signs reviewed since admission to TCU. Patient Hmong speaking, oriented, cooperative and pleasant. No facial asymmetry. Mucous membranes moist. No HJR or cervical adenopathy. S1 and S2 regular. Pulmonary exam without rales  rhonchi or wheezes. Abdomen without masses or tenderness. Skin turgor intact. Nonpalpable pulses bilateral pedal, ischemic changes forefoot left greater than right with violaceous coloration, bluish discoloration with dependency of feet. Early gangrenous changes right second and fourth toes, spotty gangrenous changes left digits. Painful to palpation. No calf tenderness or swelling.    Impression and plan:   ischemic changes bilateral forefeet, no evidence of embolic phenomena, open areas digits and early gangrenous changes, Silvadene to be applied, no evidence of infection. Pain control appears adequate with current gabapentin dose and tramadol.   End-stage renal disease on hemodialysis to be continued.   Anemia of chronic disease with recent hemoglobin 7.0.   Hypertension with adequate control of blood pressure.   Diabetes mellitus, Accu checks to be followed.   Paroxysmal atrial fibrillation anticoagulated.   Cerebral vascular disease without new neurologic findings or focal neurologic deficit.   Coronary artery disease without indication of angina.  Issues of pain management and management of gangrenous changes reviewed with nursing staff and patient, vascular appointment scheduled, monitor for septic changes. Outside medical records reviewed, medications reviewed, discussion with nursing staff and patient.      Electronically signed by: Millie Farias MD

## 2021-06-18 NOTE — LETTER
Letter by Julieta Stewart CNP at      Author: Julieta Stewart CNP Service: -- Author Type: --    Filed:  Encounter Date: 2/11/2019 Status: (Other)         Patient: Graciela Valdivia   MR Number: [id number]   YOB: 1945   Date of Visit: 2/11/2019     [facility]    Facility:   [facility] [[id number]]   Code Status: POLST AVAILABLE      CHIEF COMPLAINT/REASON FOR VISIT:  Chief Complaint   Patient presents with   ? Problem Visit     INR, nausea        HISTORY:      HPI: Graciela is a 73 y.o. female patient with history of diabetes mellitus, hypertension, A. Fib, and end-stage renal disease on hemodialysis; became short of breath and had missed her dialysis appointment because of her foot pain and when she arrived at dialysis for her next appointment she became short of breath with hypoxemia and was transferred via EMS to [facility].  She was in A. fib with RVR but converted to sinus rhythm on her own.  Reportedly scheduled for vascular surgery as an outpatient in 2-4 weeks; she had been previously seen at [facility] 2 weeks prior for foot pain and was treated with antibiotics.    Today: Patient seen for supra therapeutic INR of 5.6.  We will hold times 2 days and recheck on Wednesday.  Patient is variable in taking her medications with compliance, has refused afternoon meds repeatedly and then began taking them.  Per afternoon nurse, she is also had some nausea with vomiting and spitting.  Patient refuses to answer ROS today.  Social work is working on getting hmong speaking patient to visit with her for company.    PVD: To be following up with vascular 2 to 4 weeks for outpatient procedure.    A. fib: On Coumadin, INR today 5.6; see plan.  Also on metoprolol for rate control.    End-stage renal disease: On hemodialysis Monday, Wednesday, Friday.  Does take midodrine with her to appointment to account for hypotension.  On 1500ml  fluid restriction.    DM 2: On sliding  scale insulin, blood sugars have been well within normal limits in TCU.        Past Medical History:   Diagnosis Date   ? Acute hypoxemic respiratory failure (H)    ? CKD (chronic kidney disease) requiring chronic dialysis (H)    ? Critical lower limb ischemia    ? Diabetes mellitus, type II (H)    ? Essential hypertension    ? Hyperlipidemia    ? Influenza A 12/28/2016   ? PAD (peripheral artery disease) (H)    ? Paroxysmal atrial fibrillation (H) 12/28/2016   ? Pericardial cyst 2013   ? Sepsis (H)              Family History   Problem Relation Age of Onset   ? Acute Myocardial Infarction Neg Hx    ? Sudden death Neg Hx      Social History     Socioeconomic History   ? Marital status:      Spouse name: Not on file   ? Number of children: 9   ? Years of education: Not on file   ? Highest education level: Not on file   Social Needs   ? Financial resource strain: Not on file   ? Food insecurity - worry: Not on file   ? Food insecurity - inability: Not on file   ? Transportation needs - medical: Not on file   ? Transportation needs - non-medical: Not on file   Occupational History   ? Not on file   Tobacco Use   ? Smoking status: Never Smoker   ? Smokeless tobacco: Never Used   Substance and Sexual Activity   ? Alcohol use: No   ? Drug use: No   ? Sexual activity: No   Other Topics Concern   ? Not on file   Social History Narrative   ? Not on file         Review of Systems   Gastrointestinal: Positive for nausea.   per nursing.   Able to answer some yes or no questions, per nursing main concern is painful bilateral lower extremities with darkened skin.  .  Vitals:    02/14/19 1047   BP: 134/60   Pulse: 64   Temp: 98  F (36.7  C)   SpO2: 92%   Weight: 129 lb (58.5 kg)       Physical Exam   Constitutional: She appears cachectic. She is uncooperative. She is easily aroused.   Cardiovascular: Normal rate.   Pulmonary/Chest: Effort normal and breath sounds normal. No respiratory distress.   Abdominal: She exhibits no  distension.   Musculoskeletal: She exhibits no edema.   Neurological: She is alert and easily aroused.   Skin: Skin is warm and dry.   Difficult to examine given her pain, bottom of feet and toes darkened         LABS:   To be completed at dialysis.     ASSESSMENT:      ICD-10-CM    1. Paroxysmal atrial fibrillation (H) I48.0    2. PAD (peripheral artery disease) (H) I73.9    3. Nausea R11.0        PLAN:    INR 5.6; HOLD x 2 days.   Start zofran 4mg odt po q 8 hours prn.   Ensure patient gets midodrine sent with her to hemodialysis.    Electronically signed by: Julieta Stewart, CNP

## 2021-06-18 NOTE — LETTER
Letter by Millie Farias MD at      Author: Millie Farias MD Service: -- Author Type: --    Filed:  Encounter Date: 3/5/2019 Status: (Other)         Patient: Graciela Valdivia   MR Number: [id number]   YOB: 1945   Date of Visit: 3/5/2019     [facility]    Facility:   [facility] [[id number]]   Code Status: POLST AVAILABLE    Reevaluation of 73-year-old female with severe peripheral arterial disease, recent hospitalization hospitalization for ischemia bilateral feet, decline surgical intervention, transferred to TCU for rehabilitation and management of medical problems which include paroxysmal atrial fibrillation, end-stage renal disease on hemodialysis, hypertension. Has continued to decline further surgical intervention, on oxycodone for significant pain bilateral feet.    Review of systems: Hmong speaking, review of systems however appears reliable, minimal English ability. Reports pain is satisfactorily controlled. Denies fever sweats or chills. No chest pain or palpitations. No focal neurologic deficits. Remainder of 12 point review of systems obtained negative. Nursing staff report patient with improved pain control, eating poorly, continues to limit dialysis sessions.    Exam: in bed for of, oriented, drowsy, cooperative. Repeatedly states she wishes to go home. Temperature 97.1, pulse 57, 02 99%. No facial asymmetry. Skin turgor intact. No pharyngeal erythema. No HJR. S1 and S2 regular. Pulmonary exam without rales rhonchi or wheezes. Abdomen without masses tenderness organomegaly. Gangrenous changes left second and fifth toe pronounce, distal foot erythema, violaceous appearance, previous edema resolving. Pedal pulses nonpalpable. No calf tenderness or swelling.    INR greater than eight, on 2/28 INR 1.7 on to milligram.    Impression and plan:   hyper therapeutic INR, no indication of excessive bruising or bleeding, vitamin K 5 mg PO now, repeat INR 24  hours.   Critical ischemia bilateral lower extremities L>R, gangrenous changes of toes, no evidence of infection, patient has agreed to DNR DNI status and no  hospitalization, pain control adequate with oxycodone 5 to 10 mg Q4 hours PRN.   Hypertension with adequate control of blood pressure.   End-stage renal disease on hemodialysis, limiting sessions, has not declined  continued dialysis.   Coronary artery disease without evidence of angina.   Concerns reviewed with patient and nursing staff.      Electronically signed by: Millie Farias MD

## 2021-06-18 NOTE — LETTER
Letter by Millie Farias MD at      Author: Millie Farias MD Service: -- Author Type: --    Filed:  Encounter Date: 2/26/2019 Status: (Other)         Patient: Graciela Valdivia   MR Number: [id number]   YOB: 1945   Date of Visit: 2/26/2019     [facility]    Facility:   [facility] [[id number]]   Code Status: POLST AVAILABLE  Reassessment of 73 year old female residing in TCU  post hospitalization with gangrene bilateral lower extremities, critical peripheral arterial disease. History of end-stage renal disease on hemodialysis, anticoagulated for paroxysmal atrial fibrillation.    Review of systems: patient is seen with  on this occasion. Has unbearable pain bilateral lower extremities, declines surgical intervention, defers to family members who do not wish her to have surgery. Continues dialysis, finds this to be tiresome. Pain poorly controlled. Does not desire medication that will cause excessive fatigue. Agrees to DNR DNI status in view of gangrene lower extremities. Remainder of 12 point review of systems obtained negative. Discussion with social work who have suggested evaluation by Hmong speaking physician, patient declines desire for this evaluation. Discussion with nursing staff regarding pain management and long-term management, discussion with brother-in-law via phone at patient's request, he defers to patient's children to make decision regarding amputation, previously per nursing staff brother-in-law and  do not wish patient to have surgical intervention. Per patient they do not wish to take care of patient should surgery fail. Patient again agreeable to deferring to family members regarding rx of progressive gangrene lower extremities, understands potential of death from non-amputation.    Exam: temperature 97.6, pulse 64, blood pressure 128/81, respiratory 16. Alert, oriented, appears uncomfortable. Among speaking,   present. No facial asymmetry. No pharyngeal erythema. No HJR. S1 and S2 regular. Pulmonary exam without rales rhonchi or wheezes. Abdomen without masses tenderness organomegaly. Gangrenous changes bilateral  toes left greater than right, erythema and purplish discoloration distal forefoot, hypersensitivity to touch, pedal pulses nonpalpable.    INR 1.5, last INR 1.2 and 1 mg Coumadin Monday Wednesday Friday, 0.5 mg Tuesday Thursday, change to 1 mg Coumadin Q day, recheck INR 48 hours.    Impression and plan:   progressive gangrene lower extremities, patient declines surgery, discussion with social work nursing and brother-in-law via phone. Pain poorly controlled, increase oxycodone to 5 mg Q4 hours PRN pain, change to Dilaudid versus morphine should pain progress.   Patient declines hospice intervention.   Patient declines evaluation by [facility] speaking physician.   Patient agrees to DNR DNI status.   End-stage renal disease on hemodialysis, per nursing staff patient cutting sessions short, poorly tolerating sessions, continue at present.   Anticoagulation for paroxysmal atrial fibrillation with subtherapeutic INR and adjustment of Coumadin.   Total time of evaluation greater than 50 minutes, greater than 90% of time spent in direct contact with patient and with discussion regarding progressive gangrene, likelihood of death secondary to gangrene with infection, review of code status, review of medical status, all discussion with  present.        Electronically signed by: Millie Farias MD

## 2021-06-18 NOTE — LETTER
Letter by Julieta Stewart CNP at      Author: Julieta Stewart CNP Service: -- Author Type: --    Filed:  Encounter Date: 2/8/2019 Status: (Other)         Patient: Graciela Valdivia   MR Number: [id number]   YOB: 1945   Date of Visit: 2/8/2019     [facility]    Facility:   [facility] [[id number]]   Code Status: POLST AVAILABLE      CHIEF COMPLAINT/REASON FOR VISIT:  Chief Complaint   Patient presents with   ? Problem Visit       HISTORY:      HPI: Graciela is a 73 y.o. female patient with history of diabetes mellitus, hypertension, A. Fib, and end-stage renal disease on hemodialysis; became short of breath and had missed her dialysis appointment because of her foot pain and when she arrived at dialysis for her next appointment she became short of breath with hypoxemia and was transferred via EMS to [facility].  She was in A. fib with RVR but converted to sinus rhythm on her own.  Reportedly scheduled for vascular surgery as an outpatient in 2-4 weeks; she had been previously seen at [facility] 2 weeks prior for foot pain and was treated with antibiotics.    Today: Patient seen upon return from dialysis; she is alert and makes eye contact; can shake head yes or no. Denies pain. Appears comfortable lying in bed. Family in and out of facility throughout the day. VS and weights reviewed.     PVD: To be following up with vascular 2 to 4 weeks for outpatient procedure.    A. fib: On Coumadin, INR today 3.1; see plan.  Also on metoprolol for rate control.    End-stage renal disease: On hemodialysis Monday, Wednesday, Friday.  Does take midodrine with her to appointment to account for hypotension.  On 1500ml  fluid restriction.    DM 2: On sliding scale insulin, blood sugars have been well within normal limits in TCU.        Past Medical History:   Diagnosis Date   ? Acute hypoxemic respiratory failure (H)    ? CKD (chronic kidney disease) requiring chronic dialysis (H)     ? Critical lower limb ischemia    ? Diabetes mellitus, type II (H)    ? Essential hypertension    ? Hyperlipidemia    ? Influenza A 12/28/2016   ? PAD (peripheral artery disease) (H)    ? Paroxysmal atrial fibrillation (H) 12/28/2016   ? Pericardial cyst 2013   ? Sepsis (H)              Family History   Problem Relation Age of Onset   ? Acute Myocardial Infarction Neg Hx    ? Sudden death Neg Hx      Social History     Socioeconomic History   ? Marital status:      Spouse name: Not on file   ? Number of children: 9   ? Years of education: Not on file   ? Highest education level: Not on file   Social Needs   ? Financial resource strain: Not on file   ? Food insecurity - worry: Not on file   ? Food insecurity - inability: Not on file   ? Transportation needs - medical: Not on file   ? Transportation needs - non-medical: Not on file   Occupational History   ? Not on file   Tobacco Use   ? Smoking status: Never Smoker   ? Smokeless tobacco: Never Used   Substance and Sexual Activity   ? Alcohol use: No   ? Drug use: No   ? Sexual activity: No   Other Topics Concern   ? Not on file   Social History Narrative   ? Not on file         Review of Systems  Able to answer some yes or no questions, per nursing main concern is painful bilateral lower extremities with darkened skin.  .  Vitals:    02/10/19 1513   BP: 156/70   Pulse: 83   Temp: 97.5  F (36.4  C)   SpO2: 97%   Weight: 129 lb (58.5 kg)       Physical Exam   Constitutional: She appears cachectic. She is uncooperative. She is easily aroused.   Cardiovascular: Normal rate.   Pulmonary/Chest: Effort normal and breath sounds normal. No respiratory distress.   Abdominal: She exhibits no distension.   Musculoskeletal: She exhibits no edema.   Neurological: She is alert and easily aroused.   Skin: Skin is warm and dry.   Difficult to examine given her pain, bottom of feet and toes darkened         LABS:   To be completed at dialysis.     ASSESSMENT:      ICD-10-CM     1. Paroxysmal atrial fibrillation (H) I48.0        PLAN:    INR 3.1, coumadin 0.5mg on Friday and Sunday and 1mg Saturday. Recheck Monday.   Ensure patient gets midodrine sent with her to hemodialysis.    Electronically signed by: Julieta Stewart CNP

## 2021-06-18 NOTE — LETTER
Letter by Millie Farias MD at      Author: Millie Farias MD Service: -- Author Type: --    Filed:  Encounter Date: 2/21/2019 Status: (Other)         Patient: Graciela Valdivia   MR Number: [id number]   YOB: 1945   Date of Visit: 2/21/2019     [facility]    Facility:   [facility] [[id number]]   Code Status: POLST AVAILABLE    Assessment of 73-year-old female with severe peripheral vascular disease, gangrenous changes bilateral toes, paroxysmal atrial fibrillation, recent RVR resolved, end-stage renal disease on hemodialysis, failed vascular intervention left lower extremity, transferred to TCU for rehabilitation, management of medical problems. Severe left foot pain initially in TCU stay, oxycodone initiated with improvement in pain, originally declined amputation in hospital, there after agreed to amputation, now again declining amputation, per nursing staff and social work elder male family members insistent that patient does not have surgery. Recent evaluation by psychology, no change in recommendations other then to  discuss status with family members.    Review of systems: denies active foot pain. States she wishes to go home. Fatigue present at all times. Recent loose stooling resolved. No chest pain or palpitations. No focal neurologic deficits. Remainder of 12 point review of systems obtained negative.    Exam: vital signs reviewed over past 48 hours. Alert, agitated, cooperative. Mood depressed. Hmong speaking, unable to understand some English. No pharyngeal erythema. No HJR. S1 and S2 regular with systolic murmur. Pulmonary exam without rales rhonchi or wheezes. Abdomen without masses or tenderness. Gangrenous changes toes bilateral feet, previous peripheral edema and rubor of skin resolved. Darkish discoloration left greater than right distal forefoot. Joints without acute inflammatory change.    Impression and plan:   severe peripheral vascular  disease, gangrenous changes bilateral toes, patient now declining amputation, per nursing staff discussion has been made with vascular surgery, they will consider further attempts at revascularization.   Decline of amputation, male elder family members per social work and nursing refuse to allow patient to have amputation. Pain control currently appears adequate, continue oxycodone PRN.   Coronary artery disease without indication of angina.   History of paroxysmal atrial fibrillation, heart rate controlled.   History of cerebral vascular disease, no recent cerebral vascular events.   End-stage renal disease on hemodialysis, tolerating dialysis.   Concerns reviewed with nursing, social work, patient, recent psychology notes reviewed.      Electronically signed by: Millie Farias MD

## 2021-06-18 NOTE — LETTER
Letter by Julieta Stewart CNP at      Author: Julieta Stewart CNP Service: -- Author Type: --    Filed:  Encounter Date: 2/22/2019 Status: (Other)         Patient: Graciela Valdivia   MR Number: [id number]   YOB: 1945   Date of Visit: 2/22/2019     [facility]    Facility:   [facility] [[id number]]   Code Status: POLST AVAILABLE      CHIEF COMPLAINT/REASON FOR VISIT:  Chief Complaint   Patient presents with   ? Problem Visit     INR       HISTORY:      HPI: Graciela is a 73 y.o. female patient with history of diabetes mellitus, hypertension, A. Fib, and end-stage renal disease on hemodialysis; became short of breath and had missed her dialysis appointment because of her foot pain and when she arrived at dialysis for her next appointment she became short of breath with hypoxemia and was transferred via EMS to [facility].  She was in A. fib with RVR but converted to sinus rhythm on her own.  Reportedly scheduled for vascular surgery as an outpatient in 2-4 weeks; she had been previously seen at [facility] 2 weeks prior for foot pain and was treated with antibiotics.    Today: Patient with gangrenous vascular changes to [place] LE with extreme pain; on oxycodone with difficult to control pain. Per vascular, they recommend surgical amputation and she is currently weighing this with her family.  Patient's family decided against lower extremity amputation due to cultural reasons so at this time we will resume Coumadin and continue Lovenox until Coumadin is therapeutic.  INR today 1.2.  She does have a history of drastic swings and INR so will be conservative with Coumadin dosing.    PVD: Possible below ankle amputation; - cancelled per family and patient wishes.    A. fib: On Coumadin.  Also on metoprolol for rate control.    End-stage renal disease: On hemodialysis Monday, Wednesday, Friday.  Does take midodrine with her to appointment to account for hypotension.  On 1500ml   fluid restriction.    DM 2: On sliding scale insulin, blood sugars have been well within normal limits in TCU.        Past Medical History:   Diagnosis Date   ? Acute hypoxemic respiratory failure (H)    ? CKD (chronic kidney disease) requiring chronic dialysis (H)    ? Critical lower limb ischemia    ? Diabetes mellitus, type II (H)    ? Essential hypertension    ? Hyperlipidemia    ? Influenza A 12/28/2016   ? PAD (peripheral artery disease) (H)    ? Paroxysmal atrial fibrillation (H) 12/28/2016   ? Pericardial cyst 2013   ? Sepsis (H)              Family History   Problem Relation Age of Onset   ? Acute Myocardial Infarction Neg Hx    ? Sudden death Neg Hx      Social History     Socioeconomic History   ? Marital status:      Spouse name: Not on file   ? Number of children: 9   ? Years of education: Not on file   ? Highest education level: Not on file   Occupational History   ? Not on file   Social Needs   ? Financial resource strain: Not on file   ? Food insecurity:     Worry: Not on file     Inability: Not on file   ? Transportation needs:     Medical: Not on file     Non-medical: Not on file   Tobacco Use   ? Smoking status: Never Smoker   ? Smokeless tobacco: Never Used   Substance and Sexual Activity   ? Alcohol use: No   ? Drug use: No   ? Sexual activity: No   Lifestyle   ? Physical activity:     Days per week: Not on file     Minutes per session: Not on file   ? Stress: Not on file   Relationships   ? Social connections:     Talks on phone: Not on file     Gets together: Not on file     Attends Orthodox service: Not on file     Active member of club or organization: Not on file     Attends meetings of clubs or organizations: Not on file     Relationship status: Not on file   ? Intimate partner violence:     Fear of current or ex partner: Not on file     Emotionally abused: Not on file     Physically abused: Not on file     Forced sexual activity: Not on file   Other Topics Concern   ? Not on file    Social History Narrative   ? Not on file         Review of Systems   Gastrointestinal: Negative for nausea.   Musculoskeletal:        Vascular foot pain   Skin: Positive for color change.   per nursing.   Able to answer some yes or no questions, per nursing main concern is painful bilateral lower extremities with darkened skin. Very painful.   .  Vitals:    02/24/19 1139   BP: 150/74   Pulse: 64   Temp: 98.6  F (37  C)   SpO2: 99%   Weight: 129 lb (58.5 kg)       Physical Exam   Constitutional: She is easily aroused.   Cardiovascular: Normal rate.   Pulmonary/Chest: Effort normal and breath sounds normal. No respiratory distress.   Abdominal: She exhibits no distension.   Musculoskeletal: She exhibits no edema.   Neurological: She is alert and easily aroused.   Skin: Skin is warm and dry.   Difficult to examine given her pain, bottom of feet and toes darkened         LABS:   To be completed at dialysis.     ASSESSMENT:      ICD-10-CM    1. Paroxysmal atrial fibrillation (H) I48.0    2. PAD (peripheral artery disease) (H) I73.9        PLAN:    Give Coumadin 1 mg Monday, Wednesday, Friday and 0.5 mg all other days and recheck INR on Monday.      Electronically signed by: Julieta Stewart, CNP

## 2021-06-18 NOTE — LETTER
Letter by Julieta Stewart CNP at      Author: Julieta Stewart CNP Service: -- Author Type: --    Filed:  Encounter Date: 3/8/2019 Status: (Other)         Patient: Graciela Valdivia   MR Number: [id number]   YOB: 1945   Date of Visit: 3/8/2019     [facility]    Facility:   [facility] [[id number]]   Code Status: POLST AVAILABLE      CHIEF COMPLAINT/REASON FOR VISIT:  Chief Complaint   Patient presents with   ? Problem Visit       HISTORY:      HPI: Graciela is a 73 y.o. female patient with history of diabetes mellitus, hypertension, A. Fib, and end-stage renal disease on hemodialysis; became short of breath and had missed her dialysis appointment because of her foot pain and when she arrived at dialysis for her next appointment she became short of breath with hypoxemia and was transferred via EMS to [facility].  She was in A. fib with RVR but converted to sinus rhythm on her own.  Reportedly scheduled for vascular surgery as an outpatient in 2-4 weeks; she had been previously seen at [facility] 2 weeks prior for foot pain and was treated with antibiotics.    Today: Patient with gangrenous vascular changes to [place] LE with extreme pain; on oxycodone with difficult to control pain. Per vascular, they recommend surgical amputation and she is currently weighing this with her family.  Patient's family decided against lower extremity amputation due to cultural reasons; she has been resumed on coumadin and due to hx of drastic INR swings, will be conservative with management. Also ESRD and currently discussing future of dialysis.     PVD: Possible below ankle amputation; - cancelled per family and patient wishes.    A. fib: On Coumadin.  Also on metoprolol for rate control.    End-stage renal disease: On hemodialysis Monday, Wednesday, Friday.  Does take midodrine with her to appointment to account for hypotension.  On 1500ml  fluid restriction.    DM 2: On sliding scale  insulin, blood sugars have been well within normal limits in TCU.        Past Medical History:   Diagnosis Date   ? Acute hypoxemic respiratory failure (H)    ? CKD (chronic kidney disease) requiring chronic dialysis (H)    ? Critical lower limb ischemia    ? Diabetes mellitus, type II (H)    ? Essential hypertension    ? Hyperlipidemia    ? Influenza A 12/28/2016   ? PAD (peripheral artery disease) (H)    ? Paroxysmal atrial fibrillation (H) 12/28/2016   ? Pericardial cyst 2013   ? Sepsis (H)              Family History   Problem Relation Age of Onset   ? Acute Myocardial Infarction Neg Hx    ? Sudden death Neg Hx      Social History     Socioeconomic History   ? Marital status:      Spouse name: Not on file   ? Number of children: 9   ? Years of education: Not on file   ? Highest education level: Not on file   Occupational History   ? Not on file   Social Needs   ? Financial resource strain: Not on file   ? Food insecurity:     Worry: Not on file     Inability: Not on file   ? Transportation needs:     Medical: Not on file     Non-medical: Not on file   Tobacco Use   ? Smoking status: Never Smoker   ? Smokeless tobacco: Never Used   Substance and Sexual Activity   ? Alcohol use: No   ? Drug use: No   ? Sexual activity: No   Lifestyle   ? Physical activity:     Days per week: Not on file     Minutes per session: Not on file   ? Stress: Not on file   Relationships   ? Social connections:     Talks on phone: Not on file     Gets together: Not on file     Attends Yazdanism service: Not on file     Active member of club or organization: Not on file     Attends meetings of clubs or organizations: Not on file     Relationship status: Not on file   ? Intimate partner violence:     Fear of current or ex partner: Not on file     Emotionally abused: Not on file     Physically abused: Not on file     Forced sexual activity: Not on file   Other Topics Concern   ? Not on file   Social History Narrative   ? Not on file          Review of Systems   Gastrointestinal: Negative for nausea.   Musculoskeletal:        Vascular foot pain   Skin: Positive for color change.   per nursing.   Able to answer some yes or no questions, per nursing main concern is painful bilateral lower extremities with darkened skin. Very painful.   .  Vitals:    03/13/19 1011   BP: 177/69   Pulse: 61   Temp: 97.4  F (36.3  C)   SpO2: 97%   Weight: 119 lb (54 kg)       Physical Exam   Constitutional: She is easily aroused.   Cardiovascular: Normal rate.   Pulmonary/Chest: Effort normal and breath sounds normal. No respiratory distress.   Abdominal: She exhibits no distension.   Musculoskeletal: She exhibits no edema.   Neurological: She is alert and easily aroused.   Skin: Skin is warm and dry.   Bottom of feet and toes black, tender, cool.          LABS:   To be completed at dialysis.     ASSESSMENT:      ICD-10-CM    1. ESRD on hemodialysis (H) N18.6     Z99.2    2. PAD (peripheral artery disease) (H) I73.9    3. Paroxysmal atrial fibrillation (H) I48.0        PLAN:    INR 2.0, give 0.5mg daily and recheck Monday.  Continue prn oxycodone. May require scheduled dosing if unable to remember to ask.       Electronically signed by: Julieta Stewart CNP

## 2021-06-18 NOTE — LETTER
Letter by Millie Farias MD at      Author: Millie Farias MD Service: -- Author Type: --    Filed:  Encounter Date: 3/7/2019 Status: (Other)         Patient: Graciela Valdivia   MR Number: [id number]   YOB: 1945   Date of Visit: 3/7/2019     [facility]    Facility:   [facility] [[id number]]   Code Status: POLST AVAILABLE  Reassessment of 73-year-old female with critical ischemic changes bilateral lower extremities left greater than right, recent hospitalization for this problem, has declined to surgical intervention including amputation or revascularization, continues in TCU for management of medical problems which include atrial fibrillation, hypertension, end-stage renal disease on hemodialysis. Recent increase in INR >8, 5 mg vitamin K 48 hours ago.    Review of systems: patient is seen with her  on this occasion.  states his wife is fine, he has limited English ability, patient with limited English ability. Patient denies pain. Desires to go home. No fever sweats or chills. No focal neurologic deficits. Tolerating dialysis. Remainder of 12 point review of systems obtained negative.    Exam: vital signs reviewed over past 48 hours. Lying supine in bed, drowsy, in no apparent distress. No pharyngeal erythema. No HJR. S1 and S2 regular. Pulmonary exam without rales rhonchi or wheezes. Abdomen without masses tenderness organomegaly. Gangrenous changes left toe digits, previous edema distal lower extremities diminished, minimal gangrenous changes right digits, no calf tenderness or swelling, no warmth or erythema. No hand drift.    Impression and plan:   gangrenous changes bilateral feet, patient declining surgery, no further hospitalization, code status has been changed to DNR DNI, pain control appears adequate on oxycodone 5 - 10 mg Q6 hours PRN.   Recent significant increase in INR, INR pending, no excessive bruising or bleeding.   End-stage renal  disease on hemodialysis, per nursing staff patient has repeatedly cut her sessions short, continues however to attend dialysis.   Paroxysmal atrial fibrillation, heart rate controlled. Issues reviewed with patient and nursing staff.        Electronically signed by: Millie Farias MD

## 2021-06-18 NOTE — LETTER
Letter by Millie Farias MD at      Author: Millie Farias MD Service: -- Author Type: --    Filed:  Encounter Date: 2/7/2019 Status: (Other)         Patient: Graciela Valdivia   MR Number: [id number]   YOB: 1945   Date of Visit: 2/7/2019     [facility]    Facility:   [facility] [[id number]]   Code Status: POLST AVAILABLE    Ressessment of 73-year-old female with end-stage renal disease on hemodialysis, paroxysmal atrial fibrillation anticoagulated, diabetes mellitus 2 on insulin, hypertension, coronary artery disease, history of CVA, presented to hospital with complaints of bilateral foot pain, had failed outpatient treatment for presumed cellulitis with antibiotic and gabapentin, foot pain improved during hospitalization, transferred to TCU for rehabilitation and management of medical problems.    Review of systems: complains of unbearable bilateral forefoot pain, states she declined  amputation of the left foot during hospitalization, cannot bear her pain and wishes to have her foot amputated. Progressive discoloration bilateral forefeet and toes. Loose stooling, currently on senna, denies abdominal pain. Multiple concerns regarding care in TCU, concerns regarding difficulty with communication. Denies chest pain or palpitations. No focal neurologic deficits. Remainder of 12 point review of systems obtained negative. Interpreters present on this occasion.    Exam: sitting on edge of bed, cooperative and pleasant, appears uncomfortable. No HJR. Skin turgor intact. No pharyngeal erythema. S1 and S2 regular. Pulmonary exam without adventitious sounds. Abdomen without masses or tenderness. Bilateral forefeet with ischemic changes, progressive and continued violaceous coloration  left forefoot, progressive gangrenous changes toes, pedal pulses not palpable, trace lower extremity edema.    Impression and plan:   ischemic changes with early dry gangrene bilateral digits,  left worse than right, Silvadene currently applied, no evidence of wet gangrene or infection, no reference is made in hospital notes to any previous vascular studies, referral to surgery for opinion, begin oxycodone 2.5 mg q six hours WI N pain, monitor for excessive sedation with oxycodone.   Loose stools, on senna S which is discontinued, no evidence of acute abdomen.   Check stool for C. difficile should diarrhea continue post cessation of senna S.   Hypertension with adequate control.   Diabetes mellitus with adequate control.   End-stage renal disease continuing dialysis.   Multiple concerns regarding TCU  care reviewed.   Coronary artery disease without indication of angina.   Cerebral vascular disease with previous history of CVA, no focal neurologic deficits.   Multiple concerns reviewed, discussion with nursing staff,  present throughout, additional discussion with social work.      Electronically signed by: Millie Farias MD

## 2021-06-18 NOTE — LETTER
Letter by Millie Farias MD at      Author: Millie Farias MD Service: -- Author Type: --    Filed:  Encounter Date: 2/28/2019 Status: (Other)         Patient: Graciela Valdivia   MR Number: [id number]   YOB: 1945   Date of Visit: 2/28/2019     [facility]    Facility:   [facility] [[id number]]   Code Status: POLST AVAILABLE    Reassessment of 73-year-old female with critical ischemia bilateral lower extremities, patient has declined amputation of left lower extremity, has declined further surgical intervention with progressive gangrene bilateral toes left greater than right. Continues in TCU post hospitalization for ischemia lower extremities, continues hemodialysis for end-stage renal disease, anticoagulated for paroxysmal atrial fibrillation, recent increase in oxycodone to 5 mg Q4 hours PRN pain.    Review of systems: reports satisfactory pain management, denies change in desire for surgical intervention. No chest pain or palpitations. Profound fatigue present. Desires to return to home setting. Remainder of 12 point review of systems obtained negative.    Exam: vital signs reviewed over past 48 hours. Alert, appears uncomfortable, mood depressed, stating she wishes to go home. No pharyngeal erythema. No facially asymmetry. No HJR. S1 and S2 regular. Pulmonary exam without rales rhonchi or wheezes. Abdomen without masses tenderness organomegaly. Gangrenous changes left greater than right toes progressive, minimal soft-tissue swelling, nonpalpable pedal pulses. No calf tenderness or swelling.    INR 1.7, last INR 1.5 on 1.5 mg Coumadin, increase to 2 mg Coumadin with recheck INR five days.    Impression and plan:   progressive gangrene bilateral lower extremities, patient has declined surgery, DNR DNI status change from full code status, oxycodone currently in use for pain management, change to Dilaudid or morphine should pain become intolerable. Patient has  declined hospice placement.   Anticoagulation with subtherapeutic INR and adjustment of Coumadin, paroxysmal atrial fibrillation, heart rate remains stable.   End-stage renal disease on hemodialysis to be continued. Issues reviewed with patient nursing staff.      Electronically signed by: Millie Farias MD

## 2021-06-18 NOTE — LETTER
Letter by Millie Farias MD at      Author: Millie Farias MD Service: -- Author Type: --    Filed:  Encounter Date: 2/19/2019 Status: (Other)         Patient: Graciela Valdivia   MR Number: [id number]   YOB: 1945   Date of Visit: 2/19/2019     [facility]    Facility:   [facility] [[id number]]   Code Status: POLST AVAILABLE    Reassessment of 73-year-old female with gangrenous changes bilateral feet, severe peripheral arterial disease, recent vascular intervention, end-stage renal disease on hemodialysis, recent evaluation for atrial fibrillation with RVR, in TCU for pain management, rehabilitation, continued gangrenous changes feet. Recent initiation of oxycodone for pain management.    Review of systems: denies current pain in feet. Previously made decision to have amputation left foot, now with family intervention has decided to not undergo amputation. Desires to be discharged to home setting. Denies fever sweats or chills. Tolerating dialysis. Highly frustrated. Remainder of 12 point review of systems obtained negative.    Nursing staff and social work confirm family members have encouraged patient to not have amputation, family tradition is to not have body parts removed, patient unable to function independently at present.    Exam: vital signs reviewed. Patient Hmong speaking, unable to understand limited English, able to express self well. Frustrated, agitated, cooperative. No facial asymmetry. No credit bruits or HJR. S1 and S2 regular with systolic murmur. Pulmonary exam without rales rhonchi or wheezes. Abdomen without masses or tenderness. Advanced gangrenous changes left second fourth and fifth toes, previous rubrous discoloration left forefoot resolved, darkening of forefoot. Pedal pulses not palpable. Previous edema resolved. Right forefoot with gangrenous digital tips. No calf tenderness or swelling. No hand drift.    Impression and plan:  severe  peripheral arterial disease, gangrenous changes bilateral forefeet left greater than right, patient currently denies any pain to be present, previous rubor resolved, edema resolved, declining amputation, pain appears adequately controlled with oxycodone.   History of atrial fibrillation, recent RVR, heart rate controlled.   End-stage renal disease on hemodialysis.   Limited ability to care for self, continue rehabilitation. Issues reviewed with patient nursing staff and social work.      Electronically signed by: Millie Farias MD

## 2021-06-18 NOTE — LETTER
Letter by Julieta Stewart CNP at      Author: Julieta Stewart CNP Service: -- Author Type: --    Filed:  Encounter Date: 2/15/2019 Status: (Other)         Patient: Graciela Valdivia   MR Number: [id number]   YOB: 1945   Date of Visit: 2/15/2019     [facility]    Facility:   [facility] [[id number]]   Code Status: POLST AVAILABLE      CHIEF COMPLAINT/REASON FOR VISIT:  Chief Complaint   Patient presents with   ? Problem Visit     lovenox clarification        HISTORY:      HPI: Graciela is a 73 y.o. female patient with history of diabetes mellitus, hypertension, A. Fib, and end-stage renal disease on hemodialysis; became short of breath and had missed her dialysis appointment because of her foot pain and when she arrived at dialysis for her next appointment she became short of breath with hypoxemia and was transferred via EMS to [facility].  She was in A. fib with RVR but converted to sinus rhythm on her own.  Reportedly scheduled for vascular surgery as an outpatient in 2-4 weeks; she had been previously seen at [facility] 2 weeks prior for foot pain and was treated with antibiotics.    Today: Patient with gangrenous vascular changes to [place] LE with extreme pain; on oxycodone with difficult to control pain. Per vascular, they recommend surgical amputation and she is currently weighing this with her family. Given possibility of surgery next week, MD has put a hold on coumadin and started lovenox until 2/19; nursing is asking for clarification of order as pharmacy doesnt have a 58mg dose of lovenox; we will give 30mg twice daily for weight of 128lb/58.2kg.     PVD: Possible below ankle amputation; see above.     A. fib: On Coumadin which is on hold due to possible upcoming surgery for vascular; below ankle amputation of L foot for gangrenous vascular changes and extreme pain.  Also on metoprolol for rate control.    End-stage renal disease: On hemodialysis Monday,  Wednesday, Friday.  Does take midodrine with her to appointment to account for hypotension.  On 1500ml  fluid restriction.    DM 2: On sliding scale insulin, blood sugars have been well within normal limits in TCU.        Past Medical History:   Diagnosis Date   ? Acute hypoxemic respiratory failure (H)    ? CKD (chronic kidney disease) requiring chronic dialysis (H)    ? Critical lower limb ischemia    ? Diabetes mellitus, type II (H)    ? Essential hypertension    ? Hyperlipidemia    ? Influenza A 12/28/2016   ? PAD (peripheral artery disease) (H)    ? Paroxysmal atrial fibrillation (H) 12/28/2016   ? Pericardial cyst 2013   ? Sepsis (H)              Family History   Problem Relation Age of Onset   ? Acute Myocardial Infarction Neg Hx    ? Sudden death Neg Hx      Social History     Socioeconomic History   ? Marital status:      Spouse name: Not on file   ? Number of children: 9   ? Years of education: Not on file   ? Highest education level: Not on file   Social Needs   ? Financial resource strain: Not on file   ? Food insecurity - worry: Not on file   ? Food insecurity - inability: Not on file   ? Transportation needs - medical: Not on file   ? Transportation needs - non-medical: Not on file   Occupational History   ? Not on file   Tobacco Use   ? Smoking status: Never Smoker   ? Smokeless tobacco: Never Used   Substance and Sexual Activity   ? Alcohol use: No   ? Drug use: No   ? Sexual activity: No   Other Topics Concern   ? Not on file   Social History Narrative   ? Not on file         Review of Systems   Gastrointestinal: Positive for nausea.   per nursing.   Able to answer some yes or no questions, per nursing main concern is painful bilateral lower extremities with darkened skin. Very painful.   .  Vitals:    02/19/19 1014   BP: 123/55   Pulse: 84   Temp: 97.1  F (36.2  C)   SpO2: 100%   Weight: 128 lb (58.1 kg)       Physical Exam   Constitutional: She appears cachectic. She is uncooperative. She  is easily aroused.   Cardiovascular: Normal rate.   Pulmonary/Chest: Effort normal and breath sounds normal. No respiratory distress.   Abdominal: She exhibits no distension.   Musculoskeletal: She exhibits no edema.   Neurological: She is alert and easily aroused.   Skin: Skin is warm and dry.   Difficult to examine given her pain, bottom of feet and toes darkened         LABS:   To be completed at dialysis.     ASSESSMENT:      ICD-10-CM    1. Paroxysmal atrial fibrillation (H) I48.0    2. PAD (peripheral artery disease) (H) I73.9        PLAN:    Give lovenox 30mg/.3ml subq two times a day until 2/19 per MD order of 1mg/kg with patient weight of 128lb/58.2kg.     Electronically signed by: Julieta Stewart CNP

## 2021-06-18 NOTE — LETTER
Letter by Julieta Stewart CNP at      Author: Julieta Setwart CNP Service: -- Author Type: --    Filed:  Encounter Date: 2/6/2019 Status: (Other)         Patient: Graciela Valdivia   MR Number: [id number]   YOB: 1945   Date of Visit: 2/6/2019     [facility]    Facility:   [facility] [[id number]]   Code Status: POLST AVAILABLE      CHIEF COMPLAINT/REASON FOR VISIT:  Chief Complaint   Patient presents with   ? Review Of Multiple Medical Conditions       HISTORY:      HPI: Graciela is a 73 y.o. female patient with history of diabetes mellitus, hypertension, A. Fib, and end-stage renal disease on hemodialysis; became short of breath and had missed her dialysis appointment because of her foot pain and when she arrived at dialysis for her next appointment she became short of breath with hypoxemia and was transferred via EMS to [facility].  She was in A. fib with RVR but converted to sinus rhythm on her own.  Reportedly scheduled for vascular surgery as an outpatient in 2-4 weeks; she had been previously seen at [facility] 2 weeks prior for foot pain and was treated with antibiotics.    Today: Patient seen upon return from dialysis, she is shivering and wrapped in many blankets in bed, this is how she typically presents after dialysis.  Per nursing vital signs are stable, she is not diaphoretic or warm to touch.  Her lung sounds are clear.  She is able to answer yes and no questions.  When examining feet she pulls them away and endorses pain.    PVD: To be following up with vascular 2 to 4 weeks for outpatient procedure.    A. fib: On Coumadin, INR today 1.5, increase Coumadin 1 mg daily recheck Friday..  Also on metoprolol for rate control.    End-stage renal disease: On hemodialysis Monday, Wednesday, Friday.  Does take midodrine with her to appointment to account for hypotension.  On 1500ml  fluid restriction.    DM 2: On sliding scale insulin, blood sugars have been  well within normal limits in TCU.        Past Medical History:   Diagnosis Date   ? Acute hypoxemic respiratory failure (H)    ? CKD (chronic kidney disease) requiring chronic dialysis (H)    ? Critical lower limb ischemia    ? Diabetes mellitus, type II (H)    ? Essential hypertension    ? Hyperlipidemia    ? Influenza A 12/28/2016   ? PAD (peripheral artery disease) (H)    ? Paroxysmal atrial fibrillation (H) 12/28/2016   ? Pericardial cyst 2013   ? Sepsis (H)              Family History   Problem Relation Age of Onset   ? Acute Myocardial Infarction Neg Hx    ? Sudden death Neg Hx      Social History     Socioeconomic History   ? Marital status:      Spouse name: Not on file   ? Number of children: 9   ? Years of education: Not on file   ? Highest education level: Not on file   Social Needs   ? Financial resource strain: Not on file   ? Food insecurity - worry: Not on file   ? Food insecurity - inability: Not on file   ? Transportation needs - medical: Not on file   ? Transportation needs - non-medical: Not on file   Occupational History   ? Not on file   Tobacco Use   ? Smoking status: Never Smoker   ? Smokeless tobacco: Never Used   Substance and Sexual Activity   ? Alcohol use: No   ? Drug use: No   ? Sexual activity: No   Other Topics Concern   ? Not on file   Social History Narrative   ? Not on file         Review of Systems  Able to answer some yes or no questions, per nursing main concern is painful bilateral lower extremities with darkened skin.  .  Vitals:    02/07/19 1419   Temp: 98.2  F (36.8  C)   Weight: 126 lb (57.2 kg)       Physical Exam   Constitutional: She appears cachectic. She is uncooperative. She is easily aroused.   Cardiovascular: Normal rate.   Pulmonary/Chest: Effort normal and breath sounds normal. No respiratory distress.   Abdominal: She exhibits no distension.   Musculoskeletal: She exhibits no edema.   Neurological: She is alert and easily aroused.   Skin: Skin is warm and  dry.   Difficult to examine given her pain, bottom of feet and toes darkened         LABS:   To be completed at dialysis.     ASSESSMENT:      ICD-10-CM    1. Paroxysmal atrial fibrillation (H) I48.0    2. ESRD on hemodialysis (H) N18.6     Z99.2    3. PAD (peripheral artery disease) (H) I73.9        PLAN:    INR 1.5, increase Coumadin to 1 mg daily, recheck Friday given variability.'s  Ensure patient gets midodrine sent with her to hemodialysis.    Electronically signed by: Julieta Stewart, CNP

## 2021-06-19 NOTE — LETTER
Letter by Julieta Stewart CNP at      Author: Julieta Stewart CNP Service: -- Author Type: --    Filed:  Encounter Date: 3/18/2019 Status: (Other)         Patient: Graciela Valdivia   MR Number: [id number]   YOB: 1945   Date of Visit: 3/18/2019     [facility]    Facility:   [facility] [[id number]]   Code Status: POLST AVAILABLE      CHIEF COMPLAINT/REASON FOR VISIT:  Chief Complaint   Patient presents with   ? Problem Visit     nausea, INR       HISTORY:      HPI: Graciela is a 73 y.o. female patient with history of diabetes mellitus, hypertension, A. Fib, and end-stage renal disease on hemodialysis; became short of breath and had missed her dialysis appointment because of her foot pain and when she arrived at dialysis for her next appointment she became short of breath with hypoxemia and was transferred via EMS to [facility].  She was in A. fib with RVR but converted to sinus rhythm on her own.  Reportedly scheduled for vascular surgery as an outpatient in 2-4 weeks; she had been previously seen at [facility] 2 weeks prior for foot pain and was treated with antibiotics.    Today: INR 2.0; coumadin 0.5mg every other day and recheck Friday; Zofran is currently working on acute dementia at [place].  She reports eating and drinking well and is doing so right now I am in her room visiting.  She is alert and sitting up in bed and answering some questions with yes or no or nod of the head.    PVD: Possible below ankle amputation; - cancelled per family and patient wishes.    A. fib: On Coumadin.  Also on metoprolol for rate control.    End-stage renal disease: On hemodialysis Monday, Wednesday, Friday.  Does take midodrine with her to appointment to account for hypotension.  On 1500ml  fluid restriction.    DM 2: On sliding scale insulin, blood sugars have been well within normal limits in TCU.        Past Medical History:   Diagnosis Date   ? Acute hypoxemic respiratory  failure (H)    ? CKD (chronic kidney disease) requiring chronic dialysis (H)    ? Critical lower limb ischemia    ? Diabetes mellitus, type II (H)    ? Essential hypertension    ? Hyperlipidemia    ? Influenza A 12/28/2016   ? PAD (peripheral artery disease) (H)    ? Paroxysmal atrial fibrillation (H) 12/28/2016   ? Pericardial cyst 2013   ? Sepsis (H)              Family History   Problem Relation Age of Onset   ? Acute Myocardial Infarction Neg Hx    ? Sudden death Neg Hx      Social History     Socioeconomic History   ? Marital status:      Spouse name: Not on file   ? Number of children: 9   ? Years of education: Not on file   ? Highest education level: Not on file   Occupational History   ? Not on file   Social Needs   ? Financial resource strain: Not on file   ? Food insecurity:     Worry: Not on file     Inability: Not on file   ? Transportation needs:     Medical: Not on file     Non-medical: Not on file   Tobacco Use   ? Smoking status: Never Smoker   ? Smokeless tobacco: Never Used   Substance and Sexual Activity   ? Alcohol use: No   ? Drug use: No   ? Sexual activity: No   Lifestyle   ? Physical activity:     Days per week: Not on file     Minutes per session: Not on file   ? Stress: Not on file   Relationships   ? Social connections:     Talks on phone: Not on file     Gets together: Not on file     Attends Alevism service: Not on file     Active member of club or organization: Not on file     Attends meetings of clubs or organizations: Not on file     Relationship status: Not on file   ? Intimate partner violence:     Fear of current or ex partner: Not on file     Emotionally abused: Not on file     Physically abused: Not on file     Forced sexual activity: Not on file   Other Topics Concern   ? Not on file   Social History Narrative   ? Not on file         Review of Systems   Constitutional: Positive for appetite change.   HENT: Negative.    Respiratory: Negative.    Cardiovascular: Negative.     Gastrointestinal: Positive for nausea. Negative for vomiting.   Genitourinary: Negative.    Musculoskeletal: Negative.         Vascular foot pain   Skin: Positive for color change.   Psychiatric/Behavioral: Negative.    per nursing.   Able to answer some yes or no questions, per nursing main concern is painful bilateral lower extremities with darkened skin. Very painful.   .  Vitals:    03/20/19 1444   BP: 130/55   Pulse: 61   Temp: 98.4  F (36.9  C)   SpO2: 99%   Weight: 118 lb (53.5 kg)       Physical Exam   Constitutional: She is easily aroused.   Cardiovascular: Normal rate.   Pulmonary/Chest: Effort normal and breath sounds normal. No respiratory distress.   Abdominal: She exhibits no distension.   Musculoskeletal: She exhibits no edema.   Neurological: She is alert and easily aroused.   Skin: Skin is warm and dry.   Bottom of feet and toes black, tender, cool.          LABS:   To be completed at dialysis.     ASSESSMENT:      ICD-10-CM    1. PAD (peripheral artery disease) (H) I73.9    2. Paroxysmal atrial fibrillation (H) I48.0        PLAN:    INR 2.0, give 0.5mg every other day; recheck Friday.   Now on twice daily dosing for oxycodone for foot pain.   Nausea is improved over the weekend.  She has been eating a little bit from each meal and snacks the family brings.  She is currently sitting up beside the bed getting her lunch.  Reports that pain is under control when asked and pointed out her foot.  Reports that nausea is under control.      Electronically signed by: Julieta Stewart CNP

## 2021-06-19 NOTE — PROGRESS NOTES
[facility]    Facility:   [facility] [[id number]]   Code Status: POLST AVAILABLE  73-year-old female is seen for reevaluation,  present,  and family members present. End-stage renal disease on dialysis, hypertension, coronary artery disease, admitted to hospital with acute ischemic nubia CVA, left-sided weakness, stabilized in hospital and transferred to TCU for rehabilitation and management of medical problems. Recent decrease in lisinopril from 40 to 5 mg for significant hypotension.    Review of systems: continued improvement in left arm and leg weakness, able to stand independently from bed, requires help for transfers. Limited duration of standing and walking, participating in stairs in physical therapy. Denies new focal neurologic deficits. Highly anxious to return to home setting, frequently feels tearful missing her home. No chest pain or palpitations. Fatigue present, tolerates dialysis. Remainder of 12 point review of systems negative.    Face-to-face documentation regarding need for wheelchair. The patient has a significant mobility limitation post CVA with left sided weakness, mobility limitation impairs  her ability to participate in one or more mobility activities of daily living, the limitation cannot be resolved with an appropriately fitted walker or cane, the clients home has adequate access between rooms, maneuvering space and surfaces for a manual wheelchair to be used. The patient has not expressed an unwillingness to use the manual wheelchair, she has sufficient upper extremity function, physical and mental capabilities to safely propel a manual wheelchair during a typical day and has a caregiver who is available, willing and able to provide assistance.    Exam: vital signs reviewed over past 48 hours. Alert and oriented times three, non-English speaking. No facial asymmetry. No pharyngeal erythema. S1 and S2 regular with faint systolic  murmur. Pulmonary exam without rales rhonchi or wheezes. Abdomen without masses tenderness organomegaly. Periphery without edema. -3 strength left lower extremity, -2 strength left arm. Joints without acute inflammatory change.    Impression and plan:   status post nubia CVA, left-sided deficit resolving, continues with significant mobility limitations.   End-stage renal disease on dialysis.   Hypertension, recent decrease in lisinopril dose, blood pressure adequately controlled.   Coronary artery disease without indication of angina.   Face-to-face documentation regarding need for wheelchair.   Issues reviewed with patient family members with  present.        Electronically signed by: Millie Farias MD

## 2021-06-19 NOTE — LETTER
Letter by Millie Farias MD at      Author: Millie Farias MD Service: -- Author Type: --    Filed:  Encounter Date: 3/19/2019 Status: (Other)         Patient: Graciela Valdivia   MR Number: [id number]   YOB: 1945   Date of Visit: 3/19/2019     [facility]    Facility:   [facility] [[id number]]   Code Status: POLST AVAILABLE      Reassessment of 73-year-old female with gangrene left toes, severe peripheral arterial disease, has declined surgical intervention including amputation, continues in TCU for management of chronic medical problems which include end-stage renal disease on hemodialysis, hypertension, diabetes mellitus, atrial fibrillation anticoagulated. Recent significant nausea and vomiting reported by nursing staff.    Review of systems: patient with limited English ability,  in attendance, describes nausea much of the time. Zofran moderately beneficial, vomits frequently. Denies abdominal pain. No aspiration. No dyspeptic symptoms. Unaware of other family members with nausea or vomiting, denies unusual intake of foods or products. Fatigue present at all times. Desires to go home. No focal neurologic deficits. Reports pain of foot is adequately controlled. 12 point review of systems is otherwise negative but questionably reliable in view of limited English ability. Nursing staff report patient has repeated episodes of vomiting, Zofran appears beneficial.    Exam: lying in bed, communicates with hand gestures and with minimal English ability. Skin turgor intact. No facial asymmetry. No HJR. S1 and S2 irregular. Pulmonary exam without rales rhonchi or wheezes. No hand drift or tremor. Abdomen with normoactive bowel sounds, no organomegaly, no focal tenderness or withdrawal. Gangrenous changes left second through fourth toes with blackness, previous erythema extending from toes resolved. Pedal pulses nonpalpable. No calf tenderness or  swelling.    Impression and plan:   gangrene left foot, ischemic changes right foot minimal, patient has declined surgical intervention, receiving oxycodone for pain management, pain control appears adequate but assessment is limited by English limitation.   New onset nausea and vomiting, no evidence of acute abdomen, no evidence of acute G.I. distress or sepsis, continue Zofran which is scheduled routinely due to persistent symptoms.   Hypertension with adequate control of blood pressure.   End-stage renal disease, nursing unclear if patient is participating fully in sessions, previously has ended sessions prematurely.   Diabetes mellitus with adequate control.   Issues reviewed with patient nursing staff and  in attendance.  Electronically signed by: Millie Farias MD

## 2021-06-19 NOTE — PROGRESS NOTES
[facility]    Facility:   [facility] [[id number]]   Code Status: POLST AVAILABLE       Reevaluation of 72-year-old female with hypertension, end-stage renal disease on dialysis who experienced sudden onset of left-sided paralysis, diagnosed with right nubia ischemic infarct. Patient with coagulopathy and atrial fibrillation was not anticoagulated at time of event, acute event not embolic. During hospitalization Cardizem discontinued due to bradycardia, hypotension required decrease in lisinopril and beta blocker dose, stabilized in transferred to TCU for rehabilitation and management of medical problems.    Review of systems: patient is seen on two occasions on this date, initially independently, on second occasion with . Patient fretful and tearful, wishes to return to home setting. Denies chest pain or palpitations. Appetite adequate. Tolerating dialysis. Profound fatigue present. Denies confusion. Lightheadedness intermittently present. No focal neurologic new deficits. Remainder of 12 point review of systems obtained negative.    Exam: patient in wheelchair, tearful, blood pressure 80/40. No facial asymmetry. No pharyngeal erythema. Dense left terraces. S1 and S2 regular. Pulmonary exam without rales rhonchi or wheezes. Periphery without edema. No calf tenderness or swelling.    Impression and plan: status post basal ganglia CVA with dense left paresis, neurologically stable, continuing rehabilitation. Hypertension, significant diminished blood pressure recordings, decrease lisinopril from 40 mg Q day to 5 mg Q day. Coronary artery disease without indication of angina. End-stage renal disease on hemodialysis, blood pressure consistently low during dialysis sessions. Reactive depression related to TCU stay. Issues reviewed with patient, , nursing staff, total time of evaluation greater then 30 minutes, greater than 50% of time spent in coordination of care  and counseling.        Electronically signed by: Millie Farias MD

## 2021-06-19 NOTE — PROGRESS NOTES
[facility]    Facility:   [facility] [[id number]]   Code Status: POLST AVAILABLE    Admission evaluation to TCU of 72-year-old female. Hmong speaking. History is taken from accompanying hospital notes, limited history is available through family, patient is unable to contribute to history. End-stage renal disease on hemodialysis, hypertension, coronary artery disease, presented to hospital with left-sided weakness, diagnosed with right nubia ischemic infarct, nonembolic, patient not anticoagulated at time of admission, etiology of non-anticoagulation cannot be found on this date through history or family, known PAF, followed by neurology and cardiology in hospital, re started on Coumadin, aspirin and statin initiated, developed PAF with RVR, Cardizem discontinued due to bradycardia, hypotension developed necessitating decrease in lisinopril and metoprolol dose, bradycardia improved post cessation of Cardizem, stabilized and transferred to TCU for rehabilitation, management of medical problems.    Past medical history, current medical problem list, drug allergies, medication list, social history and code status reviewed in epic. Patient does not know family history.    Review of systems: profoundly fatigued. Extra run of dialysis occurred today. Persistent left-sided weakness. Appetite poor. No dysphagia or aspiration. No anterior chest discomfort. No dyspnea or orthopnea. Remainder of 12 point review of systems is negative or on obtainable.    Exam: patient among speaking, sitting in chair, falls asleep with ease, blood pressure 131/84, 02 99%, pulse temperature 97.7. No facial asymmetry. Orientation cannot be ascertained. Follows some commands. No pharyngeal erythema. Thyroid without nodularity or tenderness. No carotid bruits or HJR. No cervical adenopathy. S1 and S2 regular. Pulmonary exam without rales rhonchi or wheezes. Abdomen without masses tenderness organomegaly.  Periphery without edema. Pedal pulses symmetrical. No calf tenderness or swelling. -2 strength left upper and lower extremity.    INR 2.8, last INR 3.7 with Coumadin on hold, begin Coumadin 0.5 mg Q day, recheck INR four days.    Impression and plan:   right nubia CVA with resultant left-sided weakness, no progression of symptoms, currently on Coumadin and aspirin, continue to monitor.   Hereditary coagulopathy present in addition to PAF. Recent elevated INR, review of Coumadin.   Hypertension, hypotension in hospital, metoprolol 25 mg, lisinopril 20, satisfactory control of blood pressure, continue to monitor, avoid hypotension.   End-stage renal disease on hemodialysis with runs on Monday Wednesday Friday.   Coronary artery disease without indication of angina.   Diabetes mellitus, sliding-scale insulin in use, blood glucose consistently normal, discontinue Accu checks and sliding-scale insulin.   History of chronic low back pain, no complaints of low back pain at present, lidocaine patch in use.   Outside medical records reviewed, multiple medical issues reviewed, review of anticoagulation, continues proceed with rehabilitation.      Electronically signed by: Millie Farias MD

## 2021-06-19 NOTE — LETTER
Letter by Millie Farias MD at      Author: Millie Farias MD Service: -- Author Type: --    Filed:  Encounter Date: 3/14/2019 Status: (Other)         Patient: Graciela Valdivia   MR Number: [id number]   YOB: 1945   Date of Visit: 3/14/2019     [facility]    Facility:   [facility] [[id number]]   Code Status: POLST AVAILABLE    Reassessment of 73-year-old female who continues in TCU  with gangrene left greater than right foot, critical peripheral arterial disease, patient has declined further surgical intervention including revascularization, family not in agreement with amputation, patient agrees with family. Pain control with oxycodone. Nursing report significant nausea over past 24 hours.    Review of systems: nausea recurrent, vomiting repetitively. Denies abdominal pain. No similar symptoms in past. Zofran 4 mg Q4 hours PRN not controlling symptoms. Minimal oral intake. No fever sweats or chills. No similar symptoms in past. Tolerated dialysis yesterday. Oxycodone tolerated. Remainder of 12 point review of systems obtained negative, questionably reliable in view of limited English ability.    Exam: vital signs reviewed over past 48 hours including afebrile status. Patient communicative with minimal English ability, gestures. Appears uncomfortable, sitting on edge of bed, skin turgor intact. No pharyngeal erythema. No HJR. S1 and S2 regular. Pulmonary exam without rales rhonchi or wheezes. Abdomen without focal tenderness or withdrawal, no organomegaly. Periphery without edema. Gangrenous changes bilateral feet are not observed directly.    Impression and plan:   peripheral arterial disease with gangrene bilateral feet, remains afebrile, no indication of sepsis.   Persistent nausea and vomiting, no evidence of acute abdomen on exam, increase Zofran to 8 mg QID PRN, continue observation, patient has agreed to no further hospitalization in view of progressive  gangrene and decline  for intervention. Patient has additionally declined hospice placement.   Paroxysmal atrial fibrillation, heart rate controlled.   Coronary artery disease without indication of angina.   Cerebral vascular disease without indication of focal neurologic deficit.   End-stage renal disease continuing dialysis, per previous nursing report patient shortens dialysis times periodically, patient desires to continue dialysis at present.      Electronically signed by: Millie Farias MD

## 2021-06-19 NOTE — PROGRESS NOTES
[facility]    Facility:   [facility] [[id number]]   Code Status: POLST AVAILABLE    Reassessment of 72-year-old female admitted to hospital with left-sided weakness, diagnosed with ischemic nubia CVA, stabilized in hospital and transferred to TCU for rehabilitation and management of medical problems which include end-stage renal disease on dialysis, hypertension.    Review of systems: continues to note improvement in left arm and leg strength. Tolerating dialysis. Denies chest pain or palpitations. Appetite adequate. No excessive bruising or bleeding. Remainder of 12 point review of systems obtained negative.    Exam: vital signs reviewed, blood pressure remains satisfactorily controlled with systolic in 130 range. Alert and oriented, cooperative, limited English ability. No pharyngeal erythema. No credit bruits. S1 and S2 regular with faint systolic murmur. Pulmonary exam without rales rhonchi or wheezes. Abdomen without masses or tenderness. -2 strength left upper and lower extremity, continued improvement. Periphery without edema. Venous stasis changes lower extremities.    INR 3.0, last INR 3.1 on 0.5 mg Coumadin alternate 1 mg, change to 0.5 mg Q day with recheck INR seven days.    Impression and plan:   status post nubia CVA, left arm and leg deficit improving.   End-stage renal disease on dialysis, tolerating dialysis.   Anticoagulation with high therapeutic INR and adjustment of Coumadin.   Hypertension with adequate control.   Nursing staff report found on floor 7:29 PM, no injury noted.   Medications reviewed, anticipate discharge to home setting over next three days. Will require home PT OT and RN.      Electronically signed by: Millie Farias MD

## 2021-06-19 NOTE — PROGRESS NOTES
[facility]    Facility:   [facility] [[id number]]   Code Status: POLST AVAILABLE       Reevaluation of 72-year-old female admitted to hospital with left-sided weakness, diagnosed with acute ischemic nubia CVA, initiated on Coumadin, stabilized in hospital and transferred to TCU for rehabilitation, management of medical problems which include hypertension, coronary artery disease. Continues dialysis is for end-stage renal disease.    Review of systems: seen with  on this occasion. Improvement in left arm weakness. Left leg mildly improved. Anxious to return to home setting. No new CNS symptoms. Denies cough or sputum production. No active G.I. or  symptoms. Remainder of 12 point review of systems obtained negative.    Exam: Hmong speaking, temperature 98.6, pulse 52, respiratory 16, blood pressure 148/77. Oriented. Intermittently tearful. No facial asymmetry. No use of accessory muscles at rest. S1 and S2 irregular. Pulmonary exam without rales rhonchi or wheezes. Abdomen without masses or tenderness. -3 strength left upper extremity, -2 strength left lower extremity. INR 3.1, last INR 1.9 on 1.5 mg Coumadin, hold Coumadin times 24 hours then 0.5 mg Q day with recheck INR 48 hours.    Impression and plan: status post nubia CVA, left-sided deficit, function improving, continue rehabilitation. End-stage renal disease on hemodialysis. Hypertension, recent decrease in lisinopril from 40 to 5 mg, blood pressure control remains adequate. Coronary artery disease without indication of angina. Issues reviewed with patient and nursing staff,  present.      Electronically signed by: Millie Farias MD

## 2021-06-19 NOTE — LETTER
Letter by Millie Farias MD at      Author: Millie Farias MD Service: -- Author Type: --    Filed:  Encounter Date: 3/21/2019 Status: (Other)         Patient: Graciela Valdivia   MR Number: [id number]   YOB: 1945   Date of Visit: 3/21/2019     [facility]    Facility:   [facility] [[id number]]   Code Status: POLST AVAILABLE  73-year-old female with prolonged stay in TCU is seen for reevaluation. Original admission to hospital was with atrial fibrillation and RVR, critical ischemia left lower extremity, patient declined surgical intervention, continues in TCU  with gangrene left forefoot/toes, receiving oxycodone for pain management, continues on dialysis for end-stage renal disease, continues anticoagulated for atrial fibrillation. History of hypertension and CVA.    Review of systems: continued nausea, frequency of vomiting has decreased, denies abdominal pain. Pain adequately controlled left foot. No cardiac or pulmonary symptoms. No focal neurologic deficits. Remainder of 12 point review of systems negative but questionably reliable in view of patient's limited English understanding. Nursing staff report patient has increased complaints of nausea, vomiting periodically.    Exam: weight 118.63/19, 126.32/26. Patient appears uncomfortable, food is in place, is not eating. Skin turgor intact. No facial asymmetry. Mucous membranes dry, no pharyngeal erythema. No HJR or cervical adenopathy. S1 and S2 irregular. Pulmonary exam without adventitious sounds. Abdomen without masses tenderness or organomegaly. Periphery without edema, continued blackened toes 2-4 left foot, pedal pulses nonpalpable.    Impression and plan:   critical ischemia left lower extremity, gangrene, no evidence of sepsis.   Persistent nausea and vomiting, Zofran available, no evidence of acute abdomen, continue Zofran as necessary.   Patient has agreed to no further hospitalizations, has declined  hospice care, continue to monitor for adequacy of pain control.   End-stage renal disease on hemodialysis, continues dialysis three times weekly.   Diabetes mellitus, adequate control.   Atrial fibrillation with heart rate controlled.   Abnormal weight loss related to nausea and vomiting and poor appetite, encourage eating as tolerated.   Issues reviewed with patient and nursing staff.         Electronically signed by: Millie Farias MD

## 2021-06-19 NOTE — LETTER
Letter by Julieta Stewart CNP at      Author: Julieta Stewart CNP Service: -- Author Type: --    Filed:  Encounter Date: 3/22/2019 Status: (Other)         Patient: Graciela Valdivia   MR Number: [id number]   YOB: 1945   Date of Visit: 3/22/2019     [facility]    Facility:   [facility] [[id number]]   Code Status: POLST AVAILABLE      CHIEF COMPLAINT/REASON FOR VISIT:  Chief Complaint   Patient presents with   ? Problem Visit     coumadin/INR/pain       HISTORY:      HPI: Graciela is a 73 y.o. female patient with history of diabetes mellitus, hypertension, A. Fib, and end-stage renal disease on hemodialysis; became short of breath and had missed her dialysis appointment because of her foot pain and when she arrived at dialysis for her next appointment she became short of breath with hypoxemia and was transferred via EMS to [facility].  She was in A. fib with RVR but converted to sinus rhythm on her own.  Reportedly scheduled for vascular surgery as an outpatient in 2-4 weeks; she had been previously seen at [facility] 2 weeks prior for foot pain and was treated with antibiotics.    Today: INR 1.7 so will add another 0.5mg dose to the week (4 days a week and none 3 days per week) and recheck on Wednesday. Her INR has been variable as has her po intake. Both nausea and pain are improved per her report today as well as nursing report.     PVD: Possible below ankle amputation; - cancelled per family and patient wishes.    A. fib: On Coumadin.  Also on metoprolol for rate control.    End-stage renal disease: On hemodialysis Monday, Wednesday, Friday.  Does take midodrine with her to appointment to account for hypotension.  On 1500ml  fluid restriction.    DM 2: On sliding scale insulin, blood sugars have been well within normal limits in TCU.        Past Medical History:   Diagnosis Date   ? Acute hypoxemic respiratory failure (H)    ? CKD (chronic kidney disease)  requiring chronic dialysis (H)    ? Critical lower limb ischemia    ? Diabetes mellitus, type II (H)    ? Essential hypertension    ? Hyperlipidemia    ? Influenza A 12/28/2016   ? PAD (peripheral artery disease) (H)    ? Paroxysmal atrial fibrillation (H) 12/28/2016   ? Pericardial cyst 2013   ? Sepsis (H)              Family History   Problem Relation Age of Onset   ? Acute Myocardial Infarction Neg Hx    ? Sudden death Neg Hx      Social History     Socioeconomic History   ? Marital status:      Spouse name: Not on file   ? Number of children: 9   ? Years of education: Not on file   ? Highest education level: Not on file   Occupational History   ? Not on file   Social Needs   ? Financial resource strain: Not on file   ? Food insecurity:     Worry: Not on file     Inability: Not on file   ? Transportation needs:     Medical: Not on file     Non-medical: Not on file   Tobacco Use   ? Smoking status: Never Smoker   ? Smokeless tobacco: Never Used   Substance and Sexual Activity   ? Alcohol use: No   ? Drug use: No   ? Sexual activity: No   Lifestyle   ? Physical activity:     Days per week: Not on file     Minutes per session: Not on file   ? Stress: Not on file   Relationships   ? Social connections:     Talks on phone: Not on file     Gets together: Not on file     Attends Orthodoxy service: Not on file     Active member of club or organization: Not on file     Attends meetings of clubs or organizations: Not on file     Relationship status: Not on file   ? Intimate partner violence:     Fear of current or ex partner: Not on file     Emotionally abused: Not on file     Physically abused: Not on file     Forced sexual activity: Not on file   Other Topics Concern   ? Not on file   Social History Narrative   ? Not on file         Review of Systems   Constitutional: Positive for appetite change.   HENT: Negative.    Respiratory: Negative.    Cardiovascular: Negative.    Gastrointestinal: Positive for nausea.  Negative for vomiting.   Genitourinary: Negative.    Musculoskeletal: Negative.         Vascular foot pain   Skin: Positive for color change.   Psychiatric/Behavioral: Negative.    per nursing.   Able to answer some yes or no questions, per nursing main concern is painful bilateral lower extremities with darkened skin. Very painful.   .  Vitals:    03/26/19 1459   BP: 138/78   Pulse: 70   Temp: 98  F (36.7  C)   SpO2: 98%   Weight: 115 lb (52.2 kg)       Physical Exam   Constitutional: She is easily aroused.   Cardiovascular: Normal rate.   Pulmonary/Chest: Effort normal and breath sounds normal. No respiratory distress.   Abdominal: She exhibits no distension.   Musculoskeletal: She exhibits no edema.   Neurological: She is alert and easily aroused.   Skin: Skin is warm and dry.   Bottom of feet and toes black, tender, cool.          LABS:   To be completed at dialysis.     ASSESSMENT:      ICD-10-CM    1. Paroxysmal atrial fibrillation (H) I48.0        PLAN:    INR 1.7, give 0.5mg 4 days per week and none 3 days per week; recheck Wednesday.   Now on twice daily dosing for oxycodone for foot pain.   Nausea and pain both in control during this visit. Oxycodone scheduled has helped.       Electronically signed by: Julieta Stewart, KATHY

## 2021-06-19 NOTE — PROGRESS NOTES
[facility]    Facility:   [facility] [[id number]]   Code Status: POLST AVAILABLE    Reevaluation and discharge planning of 72-year-old female with end-stage renal disease on hemodialysis, hypertension, hereditary coagulopathy, paroxysmal atrial fibrillation, admitted to hospital with left-sided weakness, diagnosed with non-embolic nubia CVA, transferred to TCU for rehabilitation and management of medical problems. Patient is completing course in TCU. During hospitalization Cardizem discontinued due to bradycardia, lisinopril and metoprolol dose decreased, post transfer to TCU persistent decrease in blood pressure, lisinopril decreased to 5 mg Q day with satisfactory control of blood pressure. Significant improvement in left-sided weakness, full participation in physical therapy, no new focal neurologic deficits. Has remained anticoagulated through out. Anticipated discharge to home setting today with home services.    Review of systems: Hmong speaking, improvement in left-sided weakness. No new focal neurologic deficits. No palpitations. No lightheadedness. Cold at all times. Tolerating dialysis. Remainder of 12 point review of systems obtained negative.    Exam: blood pressure 125/75, pulse 59, respiratory 16, temperature 97.6, 02 100%. Pleasant and cooperative, no facial asymmetry. Mucous membranes moist. No carotid bruits. S1 and S2 regular with faint systolic murmur. Pulmonary exam without rales rhonchi or wheezes. Abdomen without masses or tenderness. -2 strength left upper and and lower extremity. Trace venous stasis changes lower extremity.    Impression and plan:   status post nubia CVA, left-sided weakness with improvement, ambulates with a walker, requires help with activities to assure stability.   Hypertension, continues metoprolol, lisinopril at 5 mg, satisfactory control of blood pressure.   Diabetes mellitus, Accu checks discontinued on admission to TCU with  persistent satisfactory blood glucose results.   End-stage renal disease on hemodialysis.   Chronic anticoagulation, patient was not anticoagulated at time of CVA, recent INR 3.0, on 0.5 mg Coumadin Q day with recheck pending in five days.   Discharge medications are as follows:  Current Outpatient Prescriptions:      aspirin 81 MG EC tablet, Take 1 tablet (81 mg total) by mouth daily., Disp: , Rfl: 0     atorvastatin (LIPITOR) 40 MG tablet, Take 1 tablet (40 mg total) by mouth daily., Disp: 30 tablet, Rfl: 0     cinacalcet (SENSIPAR) 30 MG tablet, Take 30 mg by mouth daily., Disp: , Rfl:      ipratropium-albuterol (DUO-NEB) 0.5-2.5 mg/3 mL nebulizer, Inhale 3 mL every 4 (four) hours as needed., Disp: , Rfl:      lidocaine 4 % patch, Place 1 patch on the skin daily. Remove and discard patch with 12 hours or as directed by MD., Disp: 6 patch, Rfl: 0     lisinopril (PRINIVIL,ZESTRIL) 20 MG tablet, Take 1 tablet (20 mg total) by mouth daily., Disp: 30 tablet, Rfl: 0     loperamide (IMODIUM A-D) 2 mg tablet, Take 2 mg by mouth as needed for diarrhea., Disp: , Rfl:      MAPAP ARTHRITIS PAIN 650 mg CR tablet, Take 650 mg by mouth every 8 (eight) hours as needed., Disp: , Rfl:      melatonin 3 mg Tab tablet, Take 2 tablets (6 mg total) by mouth at bedtime., Disp: , Rfl: 0     metoprolol succinate (TOPROL-XL) 25 MG, Take 1 tablet (25 mg total) by mouth daily., Disp: 30 tablet, Rfl: 0     omeprazole (PRILOSEC) 20 MG capsule, Take 1 capsule (20 mg total) by mouth daily before breakfast., Disp: 30 capsule, Rfl: 0     polyethylene glycol (MIRALAX) 17 gram packet, Take 1 packet (17 g total) by mouth daily as needed., Disp: , Rfl: 0     senna-docusate (PERICOLACE) 8.6-50 mg tablet, Take 2 tablets by mouth 2 (two) times a day as needed for constipation., Disp: , Rfl: 0     sevelamer carbonate (RENVELA) 800 mg tablet, Take 800 mg by mouth 3 (three) times a day with meals. , Disp: , Rfl:      sevelamer carbonate (RENVELA) 800 mg  tablet, Take 800 mg by mouth daily as needed (snack)., Disp: , Rfl:      simethicone (MYLICON) 80 MG chewable tablet, Chew 1 tablet (80 mg total) every 6 (six) hours as needed for flatulence., Disp: , Rfl: 0     warfarin sodium (WARFARIN ORAL), Take by mouth. 7/16/18 INR 1.5 Take 1mg daily. Next INR 7/19 7/13/18 INR 2.3. Take 0.5mg daily Next INR 7/17 7/11/18 INR 3.7  Hold Warfarin on 7/11.  Next INR 7/12/18., Disp: , Rfl:  lisinopril at 5 mg, follow up will be with regular physician. Home PT/OT/RN and.      Electronically signed by: Millie Farias MD

## 2021-06-19 NOTE — LETTER
Letter by Millie Farias MD at      Author: Millie Farias MD Service: -- Author Type: --    Filed:  Encounter Date: 3/12/2019 Status: (Other)         Patient: Graciela Valdivia   MR Number: [id number]   YOB: 1945   Date of Visit: 3/12/2019     [facility]    Facility:   [facility] [[id number]]   Code Status: POLST AVAILABLE    Reevaluation of 73-year-old female with end-stage renal disease on hemodialysis, hypertension, coronary artery disease, cerebral vascular disease, gangrenous changes bilateral feet, has declined further surgical intervention or amputation, continues in TCU.    Review of systems: denies current pain. Desires to go home, another resident is leaving for home, patient tearful and pleading to be discharged to home setting. Denies cardiac or pulmonary symptoms. Poor appetite. No abdominal pain. Tolerating dialysis. Remainder of 12 point review of systems obtained negative. Accuracy of review of systems questionable as patient has limited English ability.    Exam: vital signs reviewed over past four days. Mood depressed, pleading to go home. No facial asymmetry. Mucous membranes moist. No HJR. S1 and S2 regular. Pulmonary exam without rales or wheezes. Abdomen without masses tenderness organomegaly. Gangrenous changes  toes left greater than right, pedal pulses nonpalpable left, no erythema, no withdrawal to palpation. No calf tenderness or swelling.    Impression and plan:   persistent and progressive gangrenous changes toes, critical peripheral arterial disease, patient declines further surgical intervention, receiving oxycodone for pain.   End-stage renal disease on hemodialysis, apparently tolerating dialysis sessions, per nursing at times shortens her sessions, no fluid overload on exam.   Cerebral vascular disease and coronary artery disease without evidence of current symptoms.   Medications reviewed, multiple concerns  reviewed.      Electronically signed by: Millie Farias MD

## 2021-06-19 NOTE — LETTER
Letter by Julieta Stewart CNP at      Author: Julieta Stewart CNP Service: -- Author Type: --    Filed:  Encounter Date: 3/13/2019 Status: (Other)         Patient: Graciela Valdivia   MR Number: [id number]   YOB: 1945   Date of Visit: 3/13/2019     [facility]    Facility:   [facility] [[id number]]   Code Status: POLST AVAILABLE      CHIEF COMPLAINT/REASON FOR VISIT:  Chief Complaint   Patient presents with   ? Problem Visit       HISTORY:      HPI: Graciela is a 73 y.o. female patient with history of diabetes mellitus, hypertension, A. Fib, and end-stage renal disease on hemodialysis; became short of breath and had missed her dialysis appointment because of her foot pain and when she arrived at dialysis for her next appointment she became short of breath with hypoxemia and was transferred via EMS to [facility].  She was in A. fib with RVR but converted to sinus rhythm on her own.  Reportedly scheduled for vascular surgery as an outpatient in 2-4 weeks; she had been previously seen at [facility] 2 weeks prior for foot pain and was treated with antibiotics.    Today: INR 2.2; coumadin 0.5mg every other day and recheck Friday; unstable INR. Increase senna S to II tabs q day.     PVD: Possible below ankle amputation; - cancelled per family and patient wishes.    A. fib: On Coumadin.  Also on metoprolol for rate control.    End-stage renal disease: On hemodialysis Monday, Wednesday, Friday.  Does take midodrine with her to appointment to account for hypotension.  On 1500ml  fluid restriction.    DM 2: On sliding scale insulin, blood sugars have been well within normal limits in TCU.        Past Medical History:   Diagnosis Date   ? Acute hypoxemic respiratory failure (H)    ? CKD (chronic kidney disease) requiring chronic dialysis (H)    ? Critical lower limb ischemia    ? Diabetes mellitus, type II (H)    ? Essential hypertension    ? Hyperlipidemia    ? Influenza A  12/28/2016   ? PAD (peripheral artery disease) (H)    ? Paroxysmal atrial fibrillation (H) 12/28/2016   ? Pericardial cyst 2013   ? Sepsis (H)              Family History   Problem Relation Age of Onset   ? Acute Myocardial Infarction Neg Hx    ? Sudden death Neg Hx      Social History     Socioeconomic History   ? Marital status:      Spouse name: Not on file   ? Number of children: 9   ? Years of education: Not on file   ? Highest education level: Not on file   Occupational History   ? Not on file   Social Needs   ? Financial resource strain: Not on file   ? Food insecurity:     Worry: Not on file     Inability: Not on file   ? Transportation needs:     Medical: Not on file     Non-medical: Not on file   Tobacco Use   ? Smoking status: Never Smoker   ? Smokeless tobacco: Never Used   Substance and Sexual Activity   ? Alcohol use: No   ? Drug use: No   ? Sexual activity: No   Lifestyle   ? Physical activity:     Days per week: Not on file     Minutes per session: Not on file   ? Stress: Not on file   Relationships   ? Social connections:     Talks on phone: Not on file     Gets together: Not on file     Attends Taoism service: Not on file     Active member of club or organization: Not on file     Attends meetings of clubs or organizations: Not on file     Relationship status: Not on file   ? Intimate partner violence:     Fear of current or ex partner: Not on file     Emotionally abused: Not on file     Physically abused: Not on file     Forced sexual activity: Not on file   Other Topics Concern   ? Not on file   Social History Narrative   ? Not on file         Review of Systems   Constitutional: Positive for appetite change.   HENT: Negative.    Respiratory: Negative.    Cardiovascular: Negative.    Gastrointestinal: Positive for nausea.   Genitourinary: Negative.    Musculoskeletal: Negative.         Vascular foot pain   Skin: Positive for color change.   Psychiatric/Behavioral: Negative.    per  nursing.   Able to answer some yes or no questions, per nursing main concern is painful bilateral lower extremities with darkened skin. Very painful.   .  Vitals:    03/18/19 1056   BP: 99/64   Pulse: 68   Temp: 97.1  F (36.2  C)   SpO2: 96%   Weight: 119 lb (54 kg)       Physical Exam   Constitutional: She is easily aroused.   Cardiovascular: Normal rate.   Pulmonary/Chest: Effort normal and breath sounds normal. No respiratory distress.   Abdominal: She exhibits no distension.   Musculoskeletal: She exhibits no edema.   Neurological: She is alert and easily aroused.   Skin: Skin is warm and dry.   Bottom of feet and toes black, tender, cool.          LABS:   To be completed at dialysis.     ASSESSMENT:      ICD-10-CM    1. Paroxysmal atrial fibrillation (H) I48.0    2. Essential hypertension I10    3. PAD (peripheral artery disease) (H) I73.9        PLAN:    INR 2.0, give 0.5mg every other day; recheck Friday.   Continue prn oxycodone. May require scheduled dosing if unable to remember to ask.       Electronically signed by: Julieta Stewart, CNP

## 2021-06-19 NOTE — LETTER
Letter by Julieta Stewart CNP at      Author: Julieta Stewart CNP Service: -- Author Type: --    Filed:  Encounter Date: 3/15/2019 Status: (Other)         Patient: Graciela Valdivia   MR Number: [id number]   YOB: 1945   Date of Visit: 3/15/2019     [facility]    Facility:   [facility] [[id number]]   Code Status: POLST AVAILABLE      CHIEF COMPLAINT/REASON FOR VISIT:  Chief Complaint   Patient presents with   ? Problem Visit     nausea, INR       HISTORY:      HPI: Graciela is a 73 y.o. female patient with history of diabetes mellitus, hypertension, A. Fib, and end-stage renal disease on hemodialysis; became short of breath and had missed her dialysis appointment because of her foot pain and when she arrived at dialysis for her next appointment she became short of breath with hypoxemia and was transferred via EMS to [facility].  She was in A. fib with RVR but converted to sinus rhythm on her own.  Reportedly scheduled for vascular surgery as an outpatient in 2-4 weeks; she had been previously seen at [facility] 2 weeks prior for foot pain and was treated with antibiotics.    Today: INR 2.2; coumadin 0.5mg every other day and recheck Monday.; unstable INR.  Continues to have some nausea with occasional vomiting however this is mildly improved since 1 day ago and increase Zofran dose.  She is eating and drinking while in the with her in her room.  I have recently scheduled oxycodone to twice daily dosing for improved pain control.  Plus as needed.    PVD: Possible below ankle amputation; - cancelled per family and patient wishes.    A. fib: On Coumadin.  Also on metoprolol for rate control.    End-stage renal disease: On hemodialysis Monday, Wednesday, Friday.  Does take midodrine with her to appointment to account for hypotension.  On 1500ml  fluid restriction.    DM 2: On sliding scale insulin, blood sugars have been well within normal limits in TCU.        Past  Medical History:   Diagnosis Date   ? Acute hypoxemic respiratory failure (H)    ? CKD (chronic kidney disease) requiring chronic dialysis (H)    ? Critical lower limb ischemia    ? Diabetes mellitus, type II (H)    ? Essential hypertension    ? Hyperlipidemia    ? Influenza A 12/28/2016   ? PAD (peripheral artery disease) (H)    ? Paroxysmal atrial fibrillation (H) 12/28/2016   ? Pericardial cyst 2013   ? Sepsis (H)              Family History   Problem Relation Age of Onset   ? Acute Myocardial Infarction Neg Hx    ? Sudden death Neg Hx      Social History     Socioeconomic History   ? Marital status:      Spouse name: Not on file   ? Number of children: 9   ? Years of education: Not on file   ? Highest education level: Not on file   Occupational History   ? Not on file   Social Needs   ? Financial resource strain: Not on file   ? Food insecurity:     Worry: Not on file     Inability: Not on file   ? Transportation needs:     Medical: Not on file     Non-medical: Not on file   Tobacco Use   ? Smoking status: Never Smoker   ? Smokeless tobacco: Never Used   Substance and Sexual Activity   ? Alcohol use: No   ? Drug use: No   ? Sexual activity: No   Lifestyle   ? Physical activity:     Days per week: Not on file     Minutes per session: Not on file   ? Stress: Not on file   Relationships   ? Social connections:     Talks on phone: Not on file     Gets together: Not on file     Attends [employer] service: Not on file     Active member of club or organization: Not on file     Attends meetings of clubs or organizations: Not on file     Relationship status: Not on file   ? Intimate partner violence:     Fear of current or ex partner: Not on file     Emotionally abused: Not on file     Physically abused: Not on file     Forced sexual activity: Not on file   Other Topics Concern   ? Not on file   Social History Narrative   ? Not on file         Review of Systems   Constitutional: Positive for appetite change.    HENT: Negative.    Respiratory: Negative.    Cardiovascular: Negative.    Gastrointestinal: Positive for nausea and vomiting.   Genitourinary: Negative.    Musculoskeletal: Negative.         Vascular foot pain   Skin: Positive for color change.   Psychiatric/Behavioral: Negative.    per nursing.   Able to answer some yes or no questions, per nursing main concern is painful bilateral lower extremities with darkened skin. Very painful.   .  Vitals:    03/20/19 1058   BP: 130/55   Pulse: 61   Temp: 98.4  F (36.9  C)   SpO2: 99%   Weight: 118 lb (53.5 kg)       Physical Exam   Constitutional: She is easily aroused.   Cardiovascular: Normal rate.   Pulmonary/Chest: Effort normal and breath sounds normal. No respiratory distress.   Abdominal: She exhibits no distension.   Musculoskeletal: She exhibits no edema.   Neurological: She is alert and easily aroused.   Skin: Skin is warm and dry.   Bottom of feet and toes black, tender, cool.          LABS:   To be completed at dialysis.     ASSESSMENT:      ICD-10-CM    1. Nausea R11.0    2. PAD (peripheral artery disease) (H) I73.9    3. Paroxysmal atrial fibrillation (H) I48.0        PLAN:    INR 2.0, give 0.5mg every other day; recheck Monday.   Now on twice daily dosing for oxycodone for foot pain.  Appears to be doing better she is up in her wheelchair briefly ambulating the halls.  Nausea is improved since 1 day ago, she is reporting that she is continuing to have some throwing up however she is willing to eat and she is drinking okay.      Electronically signed by: Julieta Stewart, CNP

## 2021-06-19 NOTE — LETTER
Letter by Millie Farias MD at      Author: Millie Farias MD Service: -- Author Type: --    Filed:  Encounter Date: 3/28/2019 Status: (Other)         Patient: Graciela Valdivia   MR Number: [id number]   YOB: 1945   Date of Visit: 3/28/2019     [facility]    Facility:   [facility] [[id number]]   Code Status: POLST AVAILABLE    73-year-old female seen for follow up evaluation and discharge planning. History of end-stage renal disease on hemodialysis, coronary artery disease, cerebral vascular disease, hypertension, peripheral vascular disease, admitted to hospital with ischemic changes left lower extremity, critical arterial insufficiency, has declined further surgical intervention for gangrenous changes, has continued on dialysis three times weekly. Family has agreed to take patient home, patient anticipates continuing dialysis. Currently receiving oxycodone 5 mg Q6 hours PRN and for significant pain related to gangrene/ischemia. Recent prolonged nausea and vomiting, currently receiving Zofran 8 mg TID on a routine basis with resolution of nausea and vomiting, no evidence of acute abdomen. Blood pressure  has been stable. Physical therapy discontinued long ago.    Review of systems: patient is seen with  and sister on this occasion. Ongoing pain left toes, mild pain right distal toes. Denies nausea or vomiting. Appetite adequate. Denies chest pain or palpitations. No fever sweats or chills. Remainder of 12 point review of systems obtained negative.  has no concerns related to patient care.    Exam: vital signs reviewed over past seven days. Hmong speaking, sitting on edge of bed in no apparent distress. Mucous membranes moist. No facial asymmetry. S1 and S2 regular. Pulmonary exam without rales rhonchi or wheezes. Abdomen without masses tenderness organomegaly. Periphery with nonpalpable pedal pulses, blackened toes one through four left foot, minimal  soft-tissue swelling forefoot, spotty gangrene right toes. No calf tenderness or swelling.    Impression and plan:   critical vascular arterial insufficiency left greater than right lower extremity, gangrene left toes extensive, patient declines surgical intervention including amputation, family does not desire patient to have amputation or surgical intervention, they have declined hospice evaluation.   Recent prolonged nausea and vomiting on Zofran 8 mg TID.   Hypertension with adequate control.   End-stage renal disease on hemodialysis, tolerating dialysis.   Cerebral vascular disease without recent neurologic events.   Patient will discharge to home in 24 hours, medications are as follows:  Current Outpatient Medications:   ?  acetaminophen (TYLENOL) 325 MG tablet, Take 650 mg by mouth every 6 (six) hours as needed for pain., Disp: , Rfl:   ?  clopidogrel (PLAVIX) 75 mg tablet, Take 75 mg by mouth daily., Disp: , Rfl:   ?  insulin aspart U-100 (NOVOLOG) 100 unit/mL injection, Inject under the skin 3 (three) times a day with meals. Per sliding scale; =no insulin, 150-199=1 u, 200-249=2 u,250-299=3 u, 300-349=4 u, 350-399=5 u, 400 or greater=6 units, call MD, Disp: , Rfl:   ?  ipratropium-albuterol (DUO-NEB) 0.5-2.5 mg/3 mL nebulizer, Inhale 3 mL every 4 (four) hours as needed., Disp: , Rfl:   ?  metoprolol succinate (TOPROL-XL) 50 MG 24 hr tablet, Take 1 tablet (50 mg total) by mouth daily., Disp: , Rfl: 0  ?  midodrine (PROAMATINE) 5 MG tablet, Take 5 mg by mouth 3 (three) times a day.    , Disp: , Rfl:   ?  polyethylene glycol (MIRALAX) 17 gram packet, Take 17 g by mouth daily as needed., Disp: , Rfl:   ?  senna (SENOKOT) 8.6 mg tablet, Take 1 tablet by mouth 2 (two) times a day., Disp: , Rfl:   ?  sevelamer carbonate (RENVELA) 800 mg tablet, Take 800 mg by mouth 3 (three) times a day before meals. And 800 mg prn take with each snack   , Disp: , Rfl:   ?  warfarin sodium (WARFARIN ORAL), Take by mouth See  Admin Instructions.    , Disp: , Rfl:  additional medications oxycodone 5 mg Q6 hours PRN, Zofran 8 mg TID . Patient will be homebound and will require registered nurse and physical therapy. Total time of discharge evaluation greater than 30 minutes, greater than 50% of time spent in coordination of care and counseling, discussion with patient, family members, nursing staff. Follow up will be with regular physician over next two weeks.      Electronically signed by: Millie Farias MD

## 2021-06-19 NOTE — LETTER
Letter by Millie Farias MD at      Author: Millie Farias MD Service: -- Author Type: --    Filed:  Encounter Date: 3/26/2019 Status: (Other)         Patient: Graciela Valdivia   MR Number: [id number]   YOB: 1945   Date of Visit: 3/26/2019     [facility]    Facility:   [facility] [[id number]]   Code Status: POLST AVAILABLE    Reassessment of 73-year-old female with gangrenous changes left greater than right foot, critical peripheral arterial disease, has declined surgical intervention, continues in TCU for pain management, management of medical problems which include hypertension, coronary artery disease, cerebral vascular disease, end-stage renal disease on dialysis three times weekly. Recent significant nausea and vomiting, now receiving Zofran 8 mg TID routinely with resolution of symptoms.    Review of systems: limited by Hmong as primary language. Pain foot continues. Tolerating oxycodone. Nausea and vomiting resolved. Denies fever sweats or chills. No anterior chest discomfort. No focal neurologic symptoms. Eating well. Remainder of 12 point review of systems obtained negative.    Social work reports patient will be discharging to home, family has decided they can care for patient in home setting.    Exam: vital signs reviewed over past seven days with satisfactory control of blood pressure. Patient alert, oriented, sitting on edge of bed, states repeatedly she wants to go home. Otherwise in no apparent distress. Mucous membranes moist. Skin turgor intact. No HJR. S1 and S2 regular. Pulmonary exam without rales rhonchi or wheezes. Abdomen without masses tenderness organomegaly. Gangrene extensively left first through fourth toes, dry, trace erythema extending from toes to forefoot, minimal soft-tissue swelling, tender to palpation distal forefoot. Trace ischemic changes right toes. Pedal pulses are nonpalpable.    Impression and plan:   peripheral arterial  disease, patient has declined surgery, dry gangrene left toes, pain appears adequately controlled with oxycodone.   Recent prolonged nausea and vomiting without evidence of acute abdomen, continue Zofran 8 mg TID with symptoms controlled.   End-stage renal disease continuing dialysis, patient has not expressed a desire to discontinue dialysis.   Hypertension with satisfactory control.   Cerebral vascular disease and coronary artery disease without indication of angina or new focal neurologic deficits.   Issues reviewed with patient, nursing staff, social work.      Electronically signed by: Millie Farias MD

## 2021-06-19 NOTE — PROGRESS NOTES
[facility]    Facility:   [facility] [[id number]]   Code Status: POLST AVAILABLE    Reassessment of 71-year-old female admitted to hospital with acute ischemic nubia CVA, resultant left hemiplegia, antihypertensives adjusted, initiated on Coumadin, chronic atrial fibrillation, stabilized and transferred to TCU for rehabilitation, management of medical problems which include end-stage renal disease on dialysis.    Review of systems: Hmong speaking, understands English, chooses not to speak at this time.  in attendance states that patient is stable, she has no pain, mood depressed. Nursing staff report no issues related to patient care, remains hemodynamically stable.    Exam: keeps eyes closed during exam, however is awake, follows directions, lying in bed in no apparent distress. Blood pressure 112/52, temperature 98.6, pulse 52, respiratory 16, 02 100%. Dense left paresis. No facial asymmetry. S1 and S2 regular. Pulmonary exam without rales rhonchi or wheezes. Abdomen without masses or tenderness. Periphery without edema. No calf tenderness or swelling. Joints without acute inflammatory change.    INR 1.9, last INR 1.5 on 1 mg Coumadin, change to 1.5 mg Coumadin with recheck INR five days.    Impression and plan:   status post right nubia CVA with dense left paresis, no change neurologically.   Hypertension, recent decrease in lisinopril to 5 mg Q day, continue to cautiously monitor blood pressure, maintain systolic greater then 120, discontinue lisinopril should systolic remain diminished.   End-stage renal end-stage renal disease on hemodialysis, hypotension with dialysis.   Coronary artery disease, no indication of angina   Anticoagulation with adjustment of Coumadin.   Continue rehabilitation measures.      Electronically signed by: Millie Farias MD

## 2021-06-23 NOTE — PROGRESS NOTES
[facility]    Facility:   [facility] [[id number]]   Code Status: POLST AVAILABLE      CHIEF COMPLAINT/REASON FOR VISIT:  Chief Complaint   Patient presents with     Problem Visit       HISTORY:      HPI: Graciela is a 73 y.o. female patient with history of diabetes mellitus, hypertension, A. Fib, and end-stage renal disease on hemodialysis; became short of breath and had missed her dialysis appointment because of her foot pain and when she arrived at dialysis for her next appointment she became short of breath with hypoxemia and was transferred via EMS to [facility].  She was in A. fib with RVR but converted to sinus rhythm on her own.  Reportedly scheduled for vascular surgery as an outpatient in 2-4 weeks; she had been previously seen at [facility] 2 weeks prior for foot pain and was treated with antibiotics.    Today: Patient seen upon return from dialysis; she is alert and makes eye contact; can shake head yes or no. Denies pain. Appears comfortable lying in bed. Family in and out of facility throughout the day. VS and weights reviewed.     PVD: To be following up with vascular 2 to 4 weeks for outpatient procedure.    A. fib: On Coumadin, INR today 3.1; see plan.  Also on metoprolol for rate control.    End-stage renal disease: On hemodialysis Monday, Wednesday, Friday.  Does take midodrine with her to appointment to account for hypotension.  On 1500ml  fluid restriction.    DM 2: On sliding scale insulin, blood sugars have been well within normal limits in TCU.        Past Medical History:   Diagnosis Date     Acute hypoxemic respiratory failure (H)      CKD (chronic kidney disease) requiring chronic dialysis (H)      Critical lower limb ischemia      Diabetes mellitus, type II (H)      Essential hypertension      Hyperlipidemia      Influenza A 12/28/2016     PAD (peripheral artery disease) (H)      Paroxysmal atrial fibrillation (H) 12/28/2016     Pericardial cyst 2013      Sepsis (H)              Family History   Problem Relation Age of Onset     Acute Myocardial Infarction Neg Hx      Sudden death Neg Hx      Social History     Socioeconomic History     Marital status:      Spouse name: Not on file     Number of children: 9     Years of education: Not on file     Highest education level: Not on file   Social Needs     Financial resource strain: Not on file     Food insecurity - worry: Not on file     Food insecurity - inability: Not on file     Transportation needs - medical: Not on file     Transportation needs - non-medical: Not on file   Occupational History     Not on file   Tobacco Use     Smoking status: Never Smoker     Smokeless tobacco: Never Used   Substance and Sexual Activity     Alcohol use: No     Drug use: No     Sexual activity: No   Other Topics Concern     Not on file   Social History Narrative     Not on file         Review of Systems  Able to answer some yes or no questions, per nursing main concern is painful bilateral lower extremities with darkened skin.  .  Vitals:    02/10/19 1513   BP: 156/70   Pulse: 83   Temp: 97.5  F (36.4  C)   SpO2: 97%   Weight: 129 lb (58.5 kg)       Physical Exam   Constitutional: She appears cachectic. She is uncooperative. She is easily aroused.   Cardiovascular: Normal rate.   Pulmonary/Chest: Effort normal and breath sounds normal. No respiratory distress.   Abdominal: She exhibits no distension.   Musculoskeletal: She exhibits no edema.   Neurological: She is alert and easily aroused.   Skin: Skin is warm and dry.   Difficult to examine given her pain, bottom of feet and toes darkened         LABS:   To be completed at dialysis.     ASSESSMENT:      ICD-10-CM    1. Paroxysmal atrial fibrillation (H) I48.0        PLAN:    INR 3.1, coumadin 0.5mg on Friday and Sunday and 1mg Saturday. Recheck Monday.   Ensure patient gets midodrine sent with her to hemodialysis.    Electronically signed by: Julieta Stewart, CNP

## 2021-06-23 NOTE — PROGRESS NOTES
[facility]    Facility:   [facility] [[id number]]   Code Status: POLST AVAILABLE      CHIEF COMPLAINT/REASON FOR VISIT:  Chief Complaint   Patient presents with     Review Of Multiple Medical Conditions       HISTORY:      HPI: Graciela is a 73 y.o. female patient with history of diabetes mellitus, hypertension, A. Fib, and end-stage renal disease on hemodialysis; became short of breath and had missed her dialysis appointment because of her foot pain and when she arrived at dialysis for her next appointment she became short of breath with hypoxemia and was transferred via EMS to [facility].  She was in A. fib with RVR but converted to sinus rhythm on her own.  Reportedly scheduled for vascular surgery as an outpatient in 2-4 weeks; she had been previously seen at [facility] 2 weeks prior for foot pain and was treated with antibiotics.    Today: Patient seen upon return from dialysis, she is shivering and wrapped in many blankets in bed, this is how she typically presents after dialysis.  Per nursing vital signs are stable, she is not diaphoretic or warm to touch.  Her lung sounds are clear.  She is able to answer yes and no questions.  When examining feet she pulls them away and endorses pain.    PVD: To be following up with vascular 2 to 4 weeks for outpatient procedure.    A. fib: On Coumadin, INR today 1.5, increase Coumadin 1 mg daily recheck Friday..  Also on metoprolol for rate control.    End-stage renal disease: On hemodialysis Monday, Wednesday, Friday.  Does take midodrine with her to appointment to account for hypotension.  On 1500ml  fluid restriction.    DM 2: On sliding scale insulin, blood sugars have been well within normal limits in TCU.        Past Medical History:   Diagnosis Date     Acute hypoxemic respiratory failure (H)      CKD (chronic kidney disease) requiring chronic dialysis (H)      Critical lower limb ischemia      Diabetes mellitus, type II (H)       Essential hypertension      Hyperlipidemia      Influenza A 12/28/2016     PAD (peripheral artery disease) (H)      Paroxysmal atrial fibrillation (H) 12/28/2016     Pericardial cyst 2013     Sepsis (H)              Family History   Problem Relation Age of Onset     Acute Myocardial Infarction Neg Hx      Sudden death Neg Hx      Social History     Socioeconomic History     Marital status:      Spouse name: Not on file     Number of children: 9     Years of education: Not on file     Highest education level: Not on file   Social Needs     Financial resource strain: Not on file     Food insecurity - worry: Not on file     Food insecurity - inability: Not on file     Transportation needs - medical: Not on file     Transportation needs - non-medical: Not on file   Occupational History     Not on file   Tobacco Use     Smoking status: Never Smoker     Smokeless tobacco: Never Used   Substance and Sexual Activity     Alcohol use: No     Drug use: No     Sexual activity: No   Other Topics Concern     Not on file   Social History Narrative     Not on file         Review of Systems  Able to answer some yes or no questions, per nursing main concern is painful bilateral lower extremities with darkened skin.  .  Vitals:    02/07/19 1419   Temp: 98.2  F (36.8  C)   Weight: 126 lb (57.2 kg)       Physical Exam   Constitutional: She appears cachectic. She is uncooperative. She is easily aroused.   Cardiovascular: Normal rate.   Pulmonary/Chest: Effort normal and breath sounds normal. No respiratory distress.   Abdominal: She exhibits no distension.   Musculoskeletal: She exhibits no edema.   Neurological: She is alert and easily aroused.   Skin: Skin is warm and dry.   Difficult to examine given her pain, bottom of feet and toes darkened         LABS:   To be completed at dialysis.     ASSESSMENT:      ICD-10-CM    1. Paroxysmal atrial fibrillation (H) I48.0    2. ESRD on hemodialysis (H) N18.6     Z99.2    3. PAD  (peripheral artery disease) (H) I73.9        PLAN:    INR 1.5, increase Coumadin to 1 mg daily, recheck Friday given variability.'s  Ensure patient gets midodrine sent with her to hemodialysis.    Electronically signed by: Julieta Stewart CNP

## 2021-06-23 NOTE — PROGRESS NOTES
[facility]    Facility:   [facility] [[id number]]   Code Status: POLST AVAILABLE      CHIEF COMPLAINT/REASON FOR VISIT:  Chief Complaint   Patient presents with     Review Of Multiple Medical Conditions       HISTORY:      HPI: Graciela is a 73 y.o. female patient with history of diabetes mellitus, hypertension, A. Fib, and end-stage renal disease on hemodialysis; became short of breath and had missed her dialysis appointment because of her foot pain and when she arrived at dialysis for her next appointment she became short of breath with hypoxemia and was transferred via EMS to [facility].  She was in A. fib with RVR but converted to sinus rhythm on her own.  Reportedly scheduled for vascular surgery as an outpatient in 2-4 weeks; she had been previously seen at [facility] 2 weeks prior for foot pain and was treated with antibiotics.    PVD: To be following up with vascular to 4 weeks for outpatient procedure.    A. fib: On Coumadin, most recent INR therapeutic.  Also on metoprolol for rate control.    End-stage renal disease: On hemodialysis Monday, Wednesday, Friday.  Does take midodrine with her to appointment to account for hypertension.  On 1500ml  fluid restriction.    DM 2: On sliding scale insulin, blood sugars have been well within normal limits in TCU.        Past Medical History:   Diagnosis Date     Acute hypoxemic respiratory failure (H)      CKD (chronic kidney disease) requiring chronic dialysis (H)      Critical lower limb ischemia      Diabetes mellitus, type II (H)      Essential hypertension      Hyperlipidemia      Influenza A 12/28/2016     PAD (peripheral artery disease) (H)      Paroxysmal atrial fibrillation (H) 12/28/2016     Pericardial cyst 2013     Sepsis (H)              Family History   Problem Relation Age of Onset     Acute Myocardial Infarction Neg Hx      Sudden death Neg Hx      Social History     Socioeconomic History     Marital status:       Spouse name: Not on file     Number of children: 9     Years of education: Not on file     Highest education level: Not on file   Social Needs     Financial resource strain: Not on file     Food insecurity - worry: Not on file     Food insecurity - inability: Not on file     Transportation needs - medical: Not on file     Transportation needs - non-medical: Not on file   Occupational History     Not on file   Tobacco Use     Smoking status: Never Smoker     Smokeless tobacco: Never Used   Substance and Sexual Activity     Alcohol use: No     Drug use: No     Sexual activity: No   Other Topics Concern     Not on file   Social History Narrative     Not on file         Review of Systems   Unable to perform ROS: Patient nonverbal   Patient refused ROS, hold her head over her head turned away.    .  Vitals:    02/05/19 0909   BP: 138/62   Pulse: 61   Temp: 98.2  F (36.8  C)   SpO2: 100%   Weight: 127 lb (57.6 kg)       Physical Exam   Constitutional: She appears cachectic. She is uncooperative. She is easily aroused.   Cardiovascular: Normal rate.   Pulmonary/Chest: Effort normal and breath sounds normal. No respiratory distress.   Abdominal: She exhibits no distension.   Musculoskeletal: She exhibits no edema.   Neurological: She is alert and easily aroused.   Skin: Skin is warm and dry.         LABS:   To be completed at dialysis.     ASSESSMENT:      ICD-10-CM    1. ESRD on hemodialysis (H) N18.6     Z99.2    2. PAD (peripheral artery disease) (H) I73.9    3. Paroxysmal atrial fibrillation (H) I48.0    4. Type 2 diabetes mellitus with other diabetic kidney complication, without long-term current use of insulin (H) E11.29        PLAN:    Continue current orders, no changes to care plan made today.  PT/OT as patient allows.    Admission history and physical per MD in the next week. At this time continue current care plan for all chronic medical conditions, as they are stable. Encouraged patient to engage in  PT/OT for strengthening and conditioning. Encouraged patient to work closely with nursing staff to ensure any medical complaints are quickly addressed. Follow up this week or sooner if needed. Will continue to monitor patient and work with nursing staff collaboratively to work toward positive patient outcomes.      Total 25 minutes of which 50% was spent counseling and coordination of care of the above plan with nursing.    Electronically signed by: Julieta Stewart CNP

## 2021-06-23 NOTE — PROGRESS NOTES
[facility]    Facility:   [facility] [[id number]]   Code Status: POLST AVAILABLE    Ressessment of 73-year-old female with end-stage renal disease on hemodialysis, paroxysmal atrial fibrillation anticoagulated, diabetes mellitus 2 on insulin, hypertension, coronary artery disease, history of CVA, presented to hospital with complaints of bilateral foot pain, had failed outpatient treatment for presumed cellulitis with antibiotic and gabapentin, foot pain improved during hospitalization, transferred to TCU for rehabilitation and management of medical problems.    Review of systems: complains of unbearable bilateral forefoot pain, states she declined  amputation of the left foot during hospitalization, cannot bear her pain and wishes to have her foot amputated. Progressive discoloration bilateral forefeet and toes. Loose stooling, currently on senna, denies abdominal pain. Multiple concerns regarding care in TCU, concerns regarding difficulty with communication. Denies chest pain or palpitations. No focal neurologic deficits. Remainder of 12 point review of systems obtained negative. Interpreters present on this occasion.    Exam: sitting on edge of bed, cooperative and pleasant, appears uncomfortable. No HJR. Skin turgor intact. No pharyngeal erythema. S1 and S2 regular. Pulmonary exam without adventitious sounds. Abdomen without masses or tenderness. Bilateral forefeet with ischemic changes, progressive and continued violaceous coloration  left forefoot, progressive gangrenous changes toes, pedal pulses not palpable, trace lower extremity edema.    Impression and plan:   ischemic changes with early dry gangrene bilateral digits, left worse than right, Silvadene currently applied, no evidence of wet gangrene or infection, no reference is made in hospital notes to any previous vascular studies, referral to surgery for opinion, begin oxycodone 2.5 mg q six hours MT N pain, monitor  for excessive sedation with oxycodone.   Loose stools, on senna S which is discontinued, no evidence of acute abdomen.   Check stool for C. difficile should diarrhea continue post cessation of senna S.   Hypertension with adequate control.   Diabetes mellitus with adequate control.   End-stage renal disease continuing dialysis.   Multiple concerns regarding TCU  care reviewed.   Coronary artery disease without indication of angina.   Cerebral vascular disease with previous history of CVA, no focal neurologic deficits.   Multiple concerns reviewed, discussion with nursing staff,  present throughout, additional discussion with social work.      Electronically signed by: Millie Farias MD

## 2021-06-23 NOTE — PROGRESS NOTES
[facility]    Facility:   [facility] [[id number]]   Code Status: POLST AVAILABLE    Admission recent TCU 73-year-old female. History is taken from hospital notes and from prior knowledge of patient, patient is able to give a limited history, Hmong speaking. Presented to hospital with two-week progressive discomfort bilateral forefeet and toes, treated as outpatient with gabapentin and antibiotic with suspicion of cellulitis, antibiotics discontinued on hospitalization, ultrasound negative for DVT, presumed to have ischemic changes from PAD, per patient report she was told she may need an amputation, declined amputation, transferred to TCU for rehabilitation, management of multiple medical problems. Per PGY2   discharge summary patient's pain improved with renal dialysis and decrease in LE edema.    Past medical history, current medical problem list, drug allergies, current medication list, social history ( non-smoker nondrinker ) reviewed in epic. Code status do not intubate. Family history, patient unaware of what her parents  of, other family members have diabetes mellitus.    Review of systems: significant pain bilateral forefoot region and toes. Pain increasing. Denies chest pain or palpitations. Fatigue present at all times. No active G.I. or  symptoms. Unaware of similar foot pain in past. No new focal neurologic deficits. Remainder of 12 point review of systems obtained negative.    Exam: vital signs reviewed since admission to TCU. Patient Hmong speaking, oriented, cooperative and pleasant. No facial asymmetry. Mucous membranes moist. No HJR or cervical adenopathy. S1 and S2 regular. Pulmonary exam without rales rhonchi or wheezes. Abdomen without masses or tenderness. Skin turgor intact. Nonpalpable pulses bilateral pedal, ischemic changes forefoot left greater than right with violaceous coloration, bluish discoloration with dependency of feet. Early gangrenous  changes right second and fourth toes, spotty gangrenous changes left digits. Painful to palpation. No calf tenderness or swelling.    Impression and plan:   ischemic changes bilateral forefeet, no evidence of embolic phenomena, open areas digits and early gangrenous changes, Silvadene to be applied, no evidence of infection. Pain control appears adequate with current gabapentin dose and tramadol.   End-stage renal disease on hemodialysis to be continued.   Anemia of chronic disease with recent hemoglobin 7.0.   Hypertension with adequate control of blood pressure.   Diabetes mellitus, Accu checks to be followed.   Paroxysmal atrial fibrillation anticoagulated.   Cerebral vascular disease without new neurologic findings or focal neurologic deficit.   Coronary artery disease without indication of angina.  Issues of pain management and management of gangrenous changes reviewed with nursing staff and patient, vascular appointment scheduled, monitor for septic changes. Outside medical records reviewed, medications reviewed, discussion with nursing staff and patient.      Electronically signed by: Millie Farias MD

## 2021-06-24 NOTE — PROGRESS NOTES
[facility]    Facility:   [facility] [[id number]]   Code Status: POLST AVAILABLE    Reassessment of 73-year-old female with gangrenous changes bilateral feet, severe peripheral arterial disease, recent vascular intervention, end-stage renal disease on hemodialysis, recent evaluation for atrial fibrillation with RVR, in TCU for pain management, rehabilitation, continued gangrenous changes feet. Recent initiation of oxycodone for pain management.    Review of systems: denies current pain in feet. Previously made decision to have amputation left foot, now with family intervention has decided to not undergo amputation. Desires to be discharged to home setting. Denies fever sweats or chills. Tolerating dialysis. Highly frustrated. Remainder of 12 point review of systems obtained negative.    Nursing staff and social work confirm family members have encouraged patient to not have amputation, family tradition is to not have body parts removed, patient unable to function independently at present.    Exam: vital signs reviewed. Patient Hmong speaking, unable to understand limited English, able to express self well. Frustrated, agitated, cooperative. No facial asymmetry. No credit bruits or HJR. S1 and S2 regular with systolic murmur. Pulmonary exam without rales rhonchi or wheezes. Abdomen without masses or tenderness. Advanced gangrenous changes left second fourth and fifth toes, previous rubrous discoloration left forefoot resolved, darkening of forefoot. Pedal pulses not palpable. Previous edema resolved. Right forefoot with gangrenous digital tips. No calf tenderness or swelling. No hand drift.    Impression and plan:  severe peripheral arterial disease, gangrenous changes bilateral forefeet left greater than right, patient currently denies any pain to be present, previous rubor resolved, edema resolved, declining amputation, pain appears adequately controlled with oxycodone.    History of atrial fibrillation, recent RVR, heart rate controlled.   End-stage renal disease on hemodialysis.   Limited ability to care for self, continue rehabilitation. Issues reviewed with patient nursing staff and social work.      Electronically signed by: Millie Farias MD

## 2021-06-24 NOTE — PROGRESS NOTES
[facility]    Facility:   [facility] [[id number]]   Code Status: POLST AVAILABLE  Reassessment of 73 year old female residing in TCU  post hospitalization with gangrene bilateral lower extremities, critical peripheral arterial disease. History of end-stage renal disease on hemodialysis, anticoagulated for paroxysmal atrial fibrillation.    Review of systems: patient is seen with  on this occasion. Has unbearable pain bilateral lower extremities, declines surgical intervention, defers to family members who do not wish her to have surgery. Continues dialysis, finds this to be tiresome. Pain poorly controlled. Does not desire medication that will cause excessive fatigue. Agrees to DNR DNI status in view of gangrene lower extremities. Remainder of 12 point review of systems obtained negative. Discussion with social work who have suggested evaluation by Hmong speaking physician, patient declines desire for this evaluation. Discussion with nursing staff regarding pain management and long-term management, discussion with brother-in-law via phone at patient's request, he defers to patient's children to make decision regarding amputation, previously per nursing staff brother-in-law and  do not wish patient to have surgical intervention. Per patient they do not wish to take care of patient should surgery fail. Patient again agreeable to deferring to family members regarding rx of progressive gangrene lower extremities, understands potential of death from non-amputation.    Exam: temperature 97.6, pulse 64, blood pressure 128/81, respiratory 16. Alert, oriented, appears uncomfortable. Among speaking,  present. No facial asymmetry. No pharyngeal erythema. No HJR. S1 and S2 regular. Pulmonary exam without rales rhonchi or wheezes. Abdomen without masses tenderness organomegaly. Gangrenous changes bilateral  toes left greater than right, erythema and purplish  discoloration distal forefoot, hypersensitivity to touch, pedal pulses nonpalpable.    INR 1.5, last INR 1.2 and 1 mg Coumadin Monday Wednesday Friday, 0.5 mg Tuesday Thursday, change to 1 mg Coumadin Q day, recheck INR 48 hours.    Impression and plan:   progressive gangrene lower extremities, patient declines surgery, discussion with social work nursing and brother-in-law via phone. Pain poorly controlled, increase oxycodone to 5 mg Q4 hours PRN pain, change to Dilaudid versus morphine should pain progress.   Patient declines hospice intervention.   Patient declines evaluation by [facility] speaking physician.   Patient agrees to DNR DNI status.   End-stage renal disease on hemodialysis, per nursing staff patient cutting sessions short, poorly tolerating sessions, continue at present.   Anticoagulation for paroxysmal atrial fibrillation with subtherapeutic INR and adjustment of Coumadin.   Total time of evaluation greater than 50 minutes, greater than 90% of time spent in direct contact with patient and with discussion regarding progressive gangrene, likelihood of death secondary to gangrene with infection, review of code status, review of medical status, all discussion with  present.        Electronically signed by: Millie Farias MD

## 2021-06-24 NOTE — PROGRESS NOTES
[facility]    Facility:   [facility] [[id number]]   Code Status: POLST AVAILABLE  Reassessment of 73-year-old female with critical ischemic changes bilateral lower extremities left greater than right, recent hospitalization for this problem, has declined to surgical intervention including amputation or revascularization, continues in TCU for management of medical problems which include atrial fibrillation, hypertension, end-stage renal disease on hemodialysis. Recent increase in INR >8, 5 mg vitamin K 48 hours ago.    Review of systems: patient is seen with her  on this occasion.  states his wife is fine, he has limited English ability, patient with limited English ability. Patient denies pain. Desires to go home. No fever sweats or chills. No focal neurologic deficits. Tolerating dialysis. Remainder of 12 point review of systems obtained negative.    Exam: vital signs reviewed over past 48 hours. Lying supine in bed, drowsy, in no apparent distress. No pharyngeal erythema. No HJR. S1 and S2 regular. Pulmonary exam without rales rhonchi or wheezes. Abdomen without masses tenderness organomegaly. Gangrenous changes left toe digits, previous edema distal lower extremities diminished, minimal gangrenous changes right digits, no calf tenderness or swelling, no warmth or erythema. No hand drift.    Impression and plan:   gangrenous changes bilateral feet, patient declining surgery, no further hospitalization, code status has been changed to DNR DNI, pain control appears adequate on oxycodone 5 - 10 mg Q6 hours PRN.   Recent significant increase in INR, INR pending, no excessive bruising or bleeding.   End-stage renal disease on hemodialysis, per nursing staff patient has repeatedly cut her sessions short, continues however to attend dialysis.   Paroxysmal atrial fibrillation, heart rate controlled. Issues reviewed with patient and nursing staff.        Electronically  signed by: Millie Farias MD

## 2021-06-24 NOTE — PROGRESS NOTES
[facility]    Facility:   [facility] [[id number]]   Code Status: POLST AVAILABLE    Reevaluation of 73-year-old female with severe peripheral arterial disease, recent hospitalization hospitalization for ischemia bilateral feet, decline surgical intervention, transferred to TCU for rehabilitation and management of medical problems which include paroxysmal atrial fibrillation, end-stage renal disease on hemodialysis, hypertension. Has continued to decline further surgical intervention, on oxycodone for significant pain bilateral feet.    Review of systems: Hmong speaking, review of systems however appears reliable, minimal English ability. Reports pain is satisfactorily controlled. Denies fever sweats or chills. No chest pain or palpitations. No focal neurologic deficits. Remainder of 12 point review of systems obtained negative. Nursing staff report patient with improved pain control, eating poorly, continues to limit dialysis sessions.    Exam: in bed for of, oriented, drowsy, cooperative. Repeatedly states she wishes to go home. Temperature 97.1, pulse 57, 02 99%. No facial asymmetry. Skin turgor intact. No pharyngeal erythema. No HJR. S1 and S2 regular. Pulmonary exam without rales rhonchi or wheezes. Abdomen without masses tenderness organomegaly. Gangrenous changes left second and fifth toe pronounce, distal foot erythema, violaceous appearance, previous edema resolving. Pedal pulses nonpalpable. No calf tenderness or swelling.    INR greater than eight, on 2/28 INR 1.7 on to milligram.    Impression and plan:   hyper therapeutic INR, no indication of excessive bruising or bleeding, vitamin K 5 mg PO now, repeat INR 24 hours.   Critical ischemia bilateral lower extremities L>R, gangrenous changes of toes, no evidence of infection, patient has agreed to DNR DNI status and no  hospitalization, pain control adequate with oxycodone 5 to 10 mg Q4 hours PRN.   Hypertension with  adequate control of blood pressure.   End-stage renal disease on hemodialysis, limiting sessions, has not declined  continued dialysis.   Coronary artery disease without evidence of angina.   Concerns reviewed with patient and nursing staff.      Electronically signed by: Millie Farias MD

## 2021-06-24 NOTE — PROGRESS NOTES
[facility]    Facility:   [facility] [[id number]]   Code Status: POLST AVAILABLE    Reassessment of 73-year-old female with critical ischemia bilateral lower extremities, patient has declined amputation of left lower extremity, has declined further surgical intervention with progressive gangrene bilateral toes left greater than right. Continues in TCU post hospitalization for ischemia lower extremities, continues hemodialysis for end-stage renal disease, anticoagulated for paroxysmal atrial fibrillation, recent increase in oxycodone to 5 mg Q4 hours PRN pain.    Review of systems: reports satisfactory pain management, denies change in desire for surgical intervention. No chest pain or palpitations. Profound fatigue present. Desires to return to home setting. Remainder of 12 point review of systems obtained negative.    Exam: vital signs reviewed over past 48 hours. Alert, appears uncomfortable, mood depressed, stating she wishes to go home. No pharyngeal erythema. No facially asymmetry. No HJR. S1 and S2 regular. Pulmonary exam without rales rhonchi or wheezes. Abdomen without masses tenderness organomegaly. Gangrenous changes left greater than right toes progressive, minimal soft-tissue swelling, nonpalpable pedal pulses. No calf tenderness or swelling.    INR 1.7, last INR 1.5 on 1.5 mg Coumadin, increase to 2 mg Coumadin with recheck INR five days.    Impression and plan:   progressive gangrene bilateral lower extremities, patient has declined surgery, DNR DNI status change from full code status, oxycodone currently in use for pain management, change to Dilaudid or morphine should pain become intolerable. Patient has declined hospice placement.   Anticoagulation with subtherapeutic INR and adjustment of Coumadin, paroxysmal atrial fibrillation, heart rate remains stable.   End-stage renal disease on hemodialysis to be continued. Issues reviewed with patient nursing  staff.      Electronically signed by: Millie Farias MD

## 2021-06-24 NOTE — PROGRESS NOTES
[facility]    Facility:   [facility] [[id number]]   Code Status: POLST AVAILABLE      CHIEF COMPLAINT/REASON FOR VISIT:  Chief Complaint   Patient presents with     Problem Visit     INR, nausea        HISTORY:      HPI: Graciela is a 73 y.o. female patient with history of diabetes mellitus, hypertension, A. Fib, and end-stage renal disease on hemodialysis; became short of breath and had missed her dialysis appointment because of her foot pain and when she arrived at dialysis for her next appointment she became short of breath with hypoxemia and was transferred via EMS to [facility].  She was in A. fib with RVR but converted to sinus rhythm on her own.  Reportedly scheduled for vascular surgery as an outpatient in 2-4 weeks; she had been previously seen at [facility] 2 weeks prior for foot pain and was treated with antibiotics.    Today: Patient seen for supra therapeutic INR of 5.6.  We will hold times 2 days and recheck on Wednesday.  Patient is variable in taking her medications with compliance, has refused afternoon meds repeatedly and then began taking them.  Per afternoon nurse, she is also had some nausea with vomiting and spitting.  Patient refuses to answer ROS today.  Social work is working on getting hmong speaking patient to visit with her for company.    PVD: To be following up with vascular 2 to 4 weeks for outpatient procedure.    A. fib: On Coumadin, INR today 5.6; see plan.  Also on metoprolol for rate control.    End-stage renal disease: On hemodialysis Monday, Wednesday, Friday.  Does take midodrine with her to appointment to account for hypotension.  On 1500ml  fluid restriction.    DM 2: On sliding scale insulin, blood sugars have been well within normal limits in TCU.        Past Medical History:   Diagnosis Date     Acute hypoxemic respiratory failure (H)      CKD (chronic kidney disease) requiring chronic dialysis (H)      Critical lower limb ischemia       Diabetes mellitus, type II (H)      Essential hypertension      Hyperlipidemia      Influenza A 12/28/2016     PAD (peripheral artery disease) (H)      Paroxysmal atrial fibrillation (H) 12/28/2016     Pericardial cyst 2013     Sepsis (H)              Family History   Problem Relation Age of Onset     Acute Myocardial Infarction Neg Hx      Sudden death Neg Hx      Social History     Socioeconomic History     Marital status:      Spouse name: Not on file     Number of children: 9     Years of education: Not on file     Highest education level: Not on file   Social Needs     Financial resource strain: Not on file     Food insecurity - worry: Not on file     Food insecurity - inability: Not on file     Transportation needs - medical: Not on file     Transportation needs - non-medical: Not on file   Occupational History     Not on file   Tobacco Use     Smoking status: Never Smoker     Smokeless tobacco: Never Used   Substance and Sexual Activity     Alcohol use: No     Drug use: No     Sexual activity: No   Other Topics Concern     Not on file   Social History Narrative     Not on file         Review of Systems   Gastrointestinal: Positive for nausea.   per nursing.   Able to answer some yes or no questions, per nursing main concern is painful bilateral lower extremities with darkened skin.  .  Vitals:    02/14/19 1047   BP: 134/60   Pulse: 64   Temp: 98  F (36.7  C)   SpO2: 92%   Weight: 129 lb (58.5 kg)       Physical Exam   Constitutional: She appears cachectic. She is uncooperative. She is easily aroused.   Cardiovascular: Normal rate.   Pulmonary/Chest: Effort normal and breath sounds normal. No respiratory distress.   Abdominal: She exhibits no distension.   Musculoskeletal: She exhibits no edema.   Neurological: She is alert and easily aroused.   Skin: Skin is warm and dry.   Difficult to examine given her pain, bottom of feet and toes darkened         LABS:   To be completed at dialysis.     ASSESSMENT:       ICD-10-CM    1. Paroxysmal atrial fibrillation (H) I48.0    2. PAD (peripheral artery disease) (H) I73.9    3. Nausea R11.0        PLAN:    INR 5.6; HOLD x 2 days.   Start zofran 4mg odt po q 8 hours prn.   Ensure patient gets midodrine sent with her to hemodialysis.    Electronically signed by: Julieta Stewart CNP

## 2021-06-24 NOTE — PROGRESS NOTES
[facility]    Facility:   [facility] [[id number]]   Code Status: POLST AVAILABLE      CHIEF COMPLAINT/REASON FOR VISIT:  Chief Complaint   Patient presents with     Problem Visit     lovenox clarification        HISTORY:      HPI: Graciela is a 73 y.o. female patient with history of diabetes mellitus, hypertension, A. Fib, and end-stage renal disease on hemodialysis; became short of breath and had missed her dialysis appointment because of her foot pain and when she arrived at dialysis for her next appointment she became short of breath with hypoxemia and was transferred via EMS to [facility].  She was in A. fib with RVR but converted to sinus rhythm on her own.  Reportedly scheduled for vascular surgery as an outpatient in 2-4 weeks; she had been previously seen at [facility] 2 weeks prior for foot pain and was treated with antibiotics.    Today: Patient with gangrenous vascular changes to [place] LE with extreme pain; on oxycodone with difficult to control pain. Per vascular, they recommend surgical amputation and she is currently weighing this with her family. Given possibility of surgery next week, MD has put a hold on coumadin and started lovenox until 2/19; nursing is asking for clarification of order as pharmacy doesnt have a 58mg dose of lovenox; we will give 30mg twice daily for weight of 128lb/58.2kg.     PVD: Possible below ankle amputation; see above.     A. fib: On Coumadin which is on hold due to possible upcoming surgery for vascular; below ankle amputation of L foot for gangrenous vascular changes and extreme pain.  Also on metoprolol for rate control.    End-stage renal disease: On hemodialysis Monday, Wednesday, Friday.  Does take midodrine with her to appointment to account for hypotension.  On 1500ml  fluid restriction.    DM 2: On sliding scale insulin, blood sugars have been well within normal limits in TCU.        Past Medical History:   Diagnosis Date      Acute hypoxemic respiratory failure (H)      CKD (chronic kidney disease) requiring chronic dialysis (H)      Critical lower limb ischemia      Diabetes mellitus, type II (H)      Essential hypertension      Hyperlipidemia      Influenza A 12/28/2016     PAD (peripheral artery disease) (H)      Paroxysmal atrial fibrillation (H) 12/28/2016     Pericardial cyst 2013     Sepsis (H)              Family History   Problem Relation Age of Onset     Acute Myocardial Infarction Neg Hx      Sudden death Neg Hx      Social History     Socioeconomic History     Marital status:      Spouse name: Not on file     Number of children: 9     Years of education: Not on file     Highest education level: Not on file   Social Needs     Financial resource strain: Not on file     Food insecurity - worry: Not on file     Food insecurity - inability: Not on file     Transportation needs - medical: Not on file     Transportation needs - non-medical: Not on file   Occupational History     Not on file   Tobacco Use     Smoking status: Never Smoker     Smokeless tobacco: Never Used   Substance and Sexual Activity     Alcohol use: No     Drug use: No     Sexual activity: No   Other Topics Concern     Not on file   Social History Narrative     Not on file         Review of Systems   Gastrointestinal: Positive for nausea.   per nursing.   Able to answer some yes or no questions, per nursing main concern is painful bilateral lower extremities with darkened skin. Very painful.   .  Vitals:    02/19/19 1014   BP: 123/55   Pulse: 84   Temp: 97.1  F (36.2  C)   SpO2: 100%   Weight: 128 lb (58.1 kg)       Physical Exam   Constitutional: She appears cachectic. She is uncooperative. She is easily aroused.   Cardiovascular: Normal rate.   Pulmonary/Chest: Effort normal and breath sounds normal. No respiratory distress.   Abdominal: She exhibits no distension.   Musculoskeletal: She exhibits no edema.   Neurological: She is alert and easily aroused.    Skin: Skin is warm and dry.   Difficult to examine given her pain, bottom of feet and toes darkened         LABS:   To be completed at dialysis.     ASSESSMENT:      ICD-10-CM    1. Paroxysmal atrial fibrillation (H) I48.0    2. PAD (peripheral artery disease) (H) I73.9        PLAN:    Give lovenox 30mg/.3ml subq two times a day until 2/19 per MD order of 1mg/kg with patient weight of 128lb/58.2kg.     Electronically signed by: Julieta Stewart, KATHY

## 2021-06-24 NOTE — PROGRESS NOTES
[facility]    Facility:   [facility] [[id number]]   Code Status: POLST AVAILABLE       Reassessment of 73-year-old female with end-stage renal disease on hemodialysis, severe ischemia bilateral feet, painful gangrenous areas, admitted to hospital with weakness and pain in feet, had been treated as outpatient for cellulitis, per patient report she declined amputation of the feet, transferred to TCU for rehabilitation, recent initiation of oxycodone for severe pain lower extremities.    Review of systems: patient is seen with her , both patient and  have limited English ability. Cooperative and pleasant, appears uncomfortable. No facial asymmetry. No HJR. S1 and S2 regular. Pulmonary exam without adventitious sounds. Abdomen without masses or tenderness. Right forefoot with gangrenous changes toes, left forefoot from mid section with discoloration, gangrenous changes toes. No calf tenderness or swelling. Exquisite tenderness to touch.    Impression and plan: ischemia bilateral feet, progressive gangrenous changes, pain continues symptomatic, oxycodone beneficial, ultrasound and vascular evaluation today. End-stage renal disease on hemodialysis, tolerating dialysis. Intermittent nausea per nursing report, Zofran available PRN. Recent loose stools resolved, no evidence of acute abdomen, obtain C. difficile should loose stooling resume. Medications reviewed, discussion with nursing.        Electronically signed by: Millie Farias MD

## 2021-06-24 NOTE — PROGRESS NOTES
[facility]    Facility:   [facility] [[id number]]   Code Status: POLST AVAILABLE    Assessment of 73-year-old female with severe peripheral vascular disease, gangrenous changes bilateral toes, paroxysmal atrial fibrillation, recent RVR resolved, end-stage renal disease on hemodialysis, failed vascular intervention left lower extremity, transferred to TCU for rehabilitation, management of medical problems. Severe left foot pain initially in TCU stay, oxycodone initiated with improvement in pain, originally declined amputation in hospital, there after agreed to amputation, now again declining amputation, per nursing staff and social work elder male family members insistent that patient does not have surgery. Recent evaluation by psychology, no change in recommendations other then to  discuss status with family members.    Review of systems: denies active foot pain. States she wishes to go home. Fatigue present at all times. Recent loose stooling resolved. No chest pain or palpitations. No focal neurologic deficits. Remainder of 12 point review of systems obtained negative.    Exam: vital signs reviewed over past 48 hours. Alert, agitated, cooperative. Mood depressed. Hmong speaking, unable to understand some English. No pharyngeal erythema. No HJR. S1 and S2 regular with systolic murmur. Pulmonary exam without rales rhonchi or wheezes. Abdomen without masses or tenderness. Gangrenous changes toes bilateral feet, previous peripheral edema and rubor of skin resolved. Darkish discoloration left greater than right distal forefoot. Joints without acute inflammatory change.    Impression and plan:   severe peripheral vascular disease, gangrenous changes bilateral toes, patient now declining amputation, per nursing staff discussion has been made with vascular surgery, they will consider further attempts at revascularization.   Decline of amputation, male elder family members per social  work and nursing refuse to allow patient to have amputation. Pain control currently appears adequate, continue oxycodone PRN.   Coronary artery disease without indication of angina.   History of paroxysmal atrial fibrillation, heart rate controlled.   History of cerebral vascular disease, no recent cerebral vascular events.   End-stage renal disease on hemodialysis, tolerating dialysis.   Concerns reviewed with nursing, social work, patient, recent psychology notes reviewed.      Electronically signed by: Millie Farias MD

## 2021-06-24 NOTE — PROGRESS NOTES
[facility]    Facility:   [facility] [[id number]]   Code Status: POLST AVAILABLE      CHIEF COMPLAINT/REASON FOR VISIT:  Chief Complaint   Patient presents with     Problem Visit     INR       HISTORY:      HPI: Graciela is a 73 y.o. female patient with history of diabetes mellitus, hypertension, A. Fib, and end-stage renal disease on hemodialysis; became short of breath and had missed her dialysis appointment because of her foot pain and when she arrived at dialysis for her next appointment she became short of breath with hypoxemia and was transferred via EMS to [facility].  She was in A. fib with RVR but converted to sinus rhythm on her own.  Reportedly scheduled for vascular surgery as an outpatient in 2-4 weeks; she had been previously seen at [facility] 2 weeks prior for foot pain and was treated with antibiotics.    Today: Patient with gangrenous vascular changes to [place] LE with extreme pain; on oxycodone with difficult to control pain. Per vascular, they recommend surgical amputation and she is currently weighing this with her family.  Patient's family decided against lower extremity amputation due to cultural reasons so at this time we will resume Coumadin and continue Lovenox until Coumadin is therapeutic.  INR today 1.2.  She does have a history of drastic swings and INR so will be conservative with Coumadin dosing.    PVD: Possible below ankle amputation; - cancelled per family and patient wishes.    A. fib: On Coumadin.  Also on metoprolol for rate control.    End-stage renal disease: On hemodialysis Monday, Wednesday, Friday.  Does take midodrine with her to appointment to account for hypotension.  On 1500ml  fluid restriction.    DM 2: On sliding scale insulin, blood sugars have been well within normal limits in TCU.        Past Medical History:   Diagnosis Date     Acute hypoxemic respiratory failure (H)      CKD (chronic kidney disease) requiring chronic dialysis  (H)      Critical lower limb ischemia      Diabetes mellitus, type II (H)      Essential hypertension      Hyperlipidemia      Influenza A 12/28/2016     PAD (peripheral artery disease) (H)      Paroxysmal atrial fibrillation (H) 12/28/2016     Pericardial cyst 2013     Sepsis (H)              Family History   Problem Relation Age of Onset     Acute Myocardial Infarction Neg Hx      Sudden death Neg Hx      Social History     Socioeconomic History     Marital status:      Spouse name: Not on file     Number of children: 9     Years of education: Not on file     Highest education level: Not on file   Occupational History     Not on file   Social Needs     Financial resource strain: Not on file     Food insecurity:     Worry: Not on file     Inability: Not on file     Transportation needs:     Medical: Not on file     Non-medical: Not on file   Tobacco Use     Smoking status: Never Smoker     Smokeless tobacco: Never Used   Substance and Sexual Activity     Alcohol use: No     Drug use: No     Sexual activity: No   Lifestyle     Physical activity:     Days per week: Not on file     Minutes per session: Not on file     Stress: Not on file   Relationships     Social connections:     Talks on phone: Not on file     Gets together: Not on file     Attends Pentecostalism service: Not on file     Active member of club or organization: Not on file     Attends meetings of clubs or organizations: Not on file     Relationship status: Not on file     Intimate partner violence:     Fear of current or ex partner: Not on file     Emotionally abused: Not on file     Physically abused: Not on file     Forced sexual activity: Not on file   Other Topics Concern     Not on file   Social History Narrative     Not on file         Review of Systems   Gastrointestinal: Negative for nausea.   Musculoskeletal:        Vascular foot pain   Skin: Positive for color change.   per nursing.   Able to answer some yes or no questions, per nursing  main concern is painful bilateral lower extremities with darkened skin. Very painful.   .  Vitals:    02/24/19 1139   BP: 150/74   Pulse: 64   Temp: 98.6  F (37  C)   SpO2: 99%   Weight: 129 lb (58.5 kg)       Physical Exam   Constitutional: She is easily aroused.   Cardiovascular: Normal rate.   Pulmonary/Chest: Effort normal and breath sounds normal. No respiratory distress.   Abdominal: She exhibits no distension.   Musculoskeletal: She exhibits no edema.   Neurological: She is alert and easily aroused.   Skin: Skin is warm and dry.   Difficult to examine given her pain, bottom of feet and toes darkened         LABS:   To be completed at dialysis.     ASSESSMENT:      ICD-10-CM    1. Paroxysmal atrial fibrillation (H) I48.0    2. PAD (peripheral artery disease) (H) I73.9        PLAN:    Give Coumadin 1 mg Monday, Wednesday, Friday and 0.5 mg all other days and recheck INR on Monday.      Electronically signed by: Julieta Stewart, CNP

## 2021-06-25 NOTE — PROGRESS NOTES
[facility]    Facility:   [facility] [[id number]]   Code Status: POLST AVAILABLE      CHIEF COMPLAINT/REASON FOR VISIT:  Chief Complaint   Patient presents with     Problem Visit       HISTORY:      HPI: Graciela is a 73 y.o. female patient with history of diabetes mellitus, hypertension, A. Fib, and end-stage renal disease on hemodialysis; became short of breath and had missed her dialysis appointment because of her foot pain and when she arrived at dialysis for her next appointment she became short of breath with hypoxemia and was transferred via EMS to [facility].  She was in A. fib with RVR but converted to sinus rhythm on her own.  Reportedly scheduled for vascular surgery as an outpatient in 2-4 weeks; she had been previously seen at [facility] 2 weeks prior for foot pain and was treated with antibiotics.    Today: INR 2.2; coumadin 0.5mg every other day and recheck Friday; unstable INR. Increase senna S to II tabs q day.     PVD: Possible below ankle amputation; - cancelled per family and patient wishes.    A. fib: On Coumadin.  Also on metoprolol for rate control.    End-stage renal disease: On hemodialysis Monday, Wednesday, Friday.  Does take midodrine with her to appointment to account for hypotension.  On 1500ml  fluid restriction.    DM 2: On sliding scale insulin, blood sugars have been well within normal limits in TCU.        Past Medical History:   Diagnosis Date     Acute hypoxemic respiratory failure (H)      CKD (chronic kidney disease) requiring chronic dialysis (H)      Critical lower limb ischemia      Diabetes mellitus, type II (H)      Essential hypertension      Hyperlipidemia      Influenza A 12/28/2016     PAD (peripheral artery disease) (H)      Paroxysmal atrial fibrillation (H) 12/28/2016     Pericardial cyst 2013     Sepsis (H)              Family History   Problem Relation Age of Onset     Acute Myocardial Infarction Neg Hx      Sudden death Neg Hx       Social History     Socioeconomic History     Marital status:      Spouse name: Not on file     Number of children: 9     Years of education: Not on file     Highest education level: Not on file   Occupational History     Not on file   Social Needs     Financial resource strain: Not on file     Food insecurity:     Worry: Not on file     Inability: Not on file     Transportation needs:     Medical: Not on file     Non-medical: Not on file   Tobacco Use     Smoking status: Never Smoker     Smokeless tobacco: Never Used   Substance and Sexual Activity     Alcohol use: No     Drug use: No     Sexual activity: No   Lifestyle     Physical activity:     Days per week: Not on file     Minutes per session: Not on file     Stress: Not on file   Relationships     Social connections:     Talks on phone: Not on file     Gets together: Not on file     Attends Sikhism service: Not on file     Active member of club or organization: Not on file     Attends meetings of clubs or organizations: Not on file     Relationship status: Not on file     Intimate partner violence:     Fear of current or ex partner: Not on file     Emotionally abused: Not on file     Physically abused: Not on file     Forced sexual activity: Not on file   Other Topics Concern     Not on file   Social History Narrative     Not on file         Review of Systems   Constitutional: Positive for appetite change.   HENT: Negative.    Respiratory: Negative.    Cardiovascular: Negative.    Gastrointestinal: Positive for nausea.   Genitourinary: Negative.    Musculoskeletal: Negative.         Vascular foot pain   Skin: Positive for color change.   Psychiatric/Behavioral: Negative.    per nursing.   Able to answer some yes or no questions, per nursing main concern is painful bilateral lower extremities with darkened skin. Very painful.   .  Vitals:    03/18/19 1056   BP: 99/64   Pulse: 68   Temp: 97.1  F (36.2  C)   SpO2: 96%   Weight: 119 lb (54 kg)        Physical Exam   Constitutional: She is easily aroused.   Cardiovascular: Normal rate.   Pulmonary/Chest: Effort normal and breath sounds normal. No respiratory distress.   Abdominal: She exhibits no distension.   Musculoskeletal: She exhibits no edema.   Neurological: She is alert and easily aroused.   Skin: Skin is warm and dry.   Bottom of feet and toes black, tender, cool.          LABS:   To be completed at dialysis.     ASSESSMENT:      ICD-10-CM    1. Paroxysmal atrial fibrillation (H) I48.0    2. Essential hypertension I10    3. PAD (peripheral artery disease) (H) I73.9        PLAN:    INR 2.0, give 0.5mg every other day; recheck Friday.   Continue prn oxycodone. May require scheduled dosing if unable to remember to ask.       Electronically signed by: Julieta Stewart CNP

## 2021-06-25 NOTE — PROGRESS NOTES
[facility]    Facility:   [facility] [[id number]]   Code Status: POLST AVAILABLE      CHIEF COMPLAINT/REASON FOR VISIT:  Chief Complaint   Patient presents with     Problem Visit     nausea, INR       HISTORY:      HPI: Graciela is a 73 y.o. female patient with history of diabetes mellitus, hypertension, A. Fib, and end-stage renal disease on hemodialysis; became short of breath and had missed her dialysis appointment because of her foot pain and when she arrived at dialysis for her next appointment she became short of breath with hypoxemia and was transferred via EMS to [facility].  She was in A. fib with RVR but converted to sinus rhythm on her own.  Reportedly scheduled for vascular surgery as an outpatient in 2-4 weeks; she had been previously seen at [facility] 2 weeks prior for foot pain and was treated with antibiotics.    Today: INR 2.2; coumadin 0.5mg every other day and recheck Monday.; unstable INR.  Continues to have some nausea with occasional vomiting however this is mildly improved since 1 day ago and increase Zofran dose.  She is eating and drinking while in the with her in her room.  I have recently scheduled oxycodone to twice daily dosing for improved pain control.  Plus as needed.    PVD: Possible below ankle amputation; - cancelled per family and patient wishes.    A. fib: On Coumadin.  Also on metoprolol for rate control.    End-stage renal disease: On hemodialysis Monday, Wednesday, Friday.  Does take midodrine with her to appointment to account for hypotension.  On 1500ml  fluid restriction.    DM 2: On sliding scale insulin, blood sugars have been well within normal limits in TCU.        Past Medical History:   Diagnosis Date     Acute hypoxemic respiratory failure (H)      CKD (chronic kidney disease) requiring chronic dialysis (H)      Critical lower limb ischemia      Diabetes mellitus, type II (H)      Essential hypertension      Hyperlipidemia       Influenza A 12/28/2016     PAD (peripheral artery disease) (H)      Paroxysmal atrial fibrillation (H) 12/28/2016     Pericardial cyst 2013     Sepsis (H)              Family History   Problem Relation Age of Onset     Acute Myocardial Infarction Neg Hx      Sudden death Neg Hx      Social History     Socioeconomic History     Marital status:      Spouse name: Not on file     Number of children: 9     Years of education: Not on file     Highest education level: Not on file   Occupational History     Not on file   Social Needs     Financial resource strain: Not on file     Food insecurity:     Worry: Not on file     Inability: Not on file     Transportation needs:     Medical: Not on file     Non-medical: Not on file   Tobacco Use     Smoking status: Never Smoker     Smokeless tobacco: Never Used   Substance and Sexual Activity     Alcohol use: No     Drug use: No     Sexual activity: No   Lifestyle     Physical activity:     Days per week: Not on file     Minutes per session: Not on file     Stress: Not on file   Relationships     Social connections:     Talks on phone: Not on file     Gets together: Not on file     Attends Anabaptism service: Not on file     Active member of club or organization: Not on file     Attends meetings of clubs or organizations: Not on file     Relationship status: Not on file     Intimate partner violence:     Fear of current or ex partner: Not on file     Emotionally abused: Not on file     Physically abused: Not on file     Forced sexual activity: Not on file   Other Topics Concern     Not on file   Social History Narrative     Not on file         Review of Systems   Constitutional: Positive for appetite change.   HENT: Negative.    Respiratory: Negative.    Cardiovascular: Negative.    Gastrointestinal: Positive for nausea and vomiting.   Genitourinary: Negative.    Musculoskeletal: Negative.         Vascular foot pain   Skin: Positive for color change.   Psychiatric/Behavioral:  Negative.    per nursing.   Able to answer some yes or no questions, per nursing main concern is painful bilateral lower extremities with darkened skin. Very painful.   .  Vitals:    03/20/19 1058   BP: 130/55   Pulse: 61   Temp: 98.4  F (36.9  C)   SpO2: 99%   Weight: 118 lb (53.5 kg)       Physical Exam   Constitutional: She is easily aroused.   Cardiovascular: Normal rate.   Pulmonary/Chest: Effort normal and breath sounds normal. No respiratory distress.   Abdominal: She exhibits no distension.   Musculoskeletal: She exhibits no edema.   Neurological: She is alert and easily aroused.   Skin: Skin is warm and dry.   Bottom of feet and toes black, tender, cool.          LABS:   To be completed at dialysis.     ASSESSMENT:      ICD-10-CM    1. Nausea R11.0    2. PAD (peripheral artery disease) (H) I73.9    3. Paroxysmal atrial fibrillation (H) I48.0        PLAN:    INR 2.0, give 0.5mg every other day; recheck Monday.   Now on twice daily dosing for oxycodone for foot pain.  Appears to be doing better she is up in her wheelchair briefly ambulating the halls.  Nausea is improved since 1 day ago, she is reporting that she is continuing to have some throwing up however she is willing to eat and she is drinking okay.      Electronically signed by: Julieta Stewart CNP

## 2021-06-25 NOTE — PROGRESS NOTES
[facility]    Facility:   [facility] [[id number]]   Code Status: POLST AVAILABLE    Reevaluation of 73-year-old female with end-stage renal disease on hemodialysis, hypertension, coronary artery disease, cerebral vascular disease, gangrenous changes bilateral feet, has declined further surgical intervention or amputation, continues in TCU.    Review of systems: denies current pain. Desires to go home, another resident is leaving for home, patient tearful and pleading to be discharged to home setting. Denies cardiac or pulmonary symptoms. Poor appetite. No abdominal pain. Tolerating dialysis. Remainder of 12 point review of systems obtained negative. Accuracy of review of systems questionable as patient has limited English ability.    Exam: vital signs reviewed over past four days. Mood depressed, pleading to go home. No facial asymmetry. Mucous membranes moist. No HJR. S1 and S2 regular. Pulmonary exam without rales or wheezes. Abdomen without masses tenderness organomegaly. Gangrenous changes  toes left greater than right, pedal pulses nonpalpable left, no erythema, no withdrawal to palpation. No calf tenderness or swelling.    Impression and plan:   persistent and progressive gangrenous changes toes, critical peripheral arterial disease, patient declines further surgical intervention, receiving oxycodone for pain.   End-stage renal disease on hemodialysis, apparently tolerating dialysis sessions, per nursing at times shortens her sessions, no fluid overload on exam.   Cerebral vascular disease and coronary artery disease without evidence of current symptoms.   Medications reviewed, multiple concerns reviewed.      Electronically signed by: Millie Farias MD

## 2021-06-25 NOTE — PROGRESS NOTES
[facility]    Facility:   [facility] [[id number]]   Code Status: POLST AVAILABLE    Reassessment of 73-year-old female who continues in TCU  with gangrene left greater than right foot, critical peripheral arterial disease, patient has declined further surgical intervention including revascularization, family not in agreement with amputation, patient agrees with family. Pain control with oxycodone. Nursing report significant nausea over past 24 hours.    Review of systems: nausea recurrent, vomiting repetitively. Denies abdominal pain. No similar symptoms in past. Zofran 4 mg Q4 hours PRN not controlling symptoms. Minimal oral intake. No fever sweats or chills. No similar symptoms in past. Tolerated dialysis yesterday. Oxycodone tolerated. Remainder of 12 point review of systems obtained negative, questionably reliable in view of limited English ability.    Exam: vital signs reviewed over past 48 hours including afebrile status. Patient communicative with minimal English ability, gestures. Appears uncomfortable, sitting on edge of bed, skin turgor intact. No pharyngeal erythema. No HJR. S1 and S2 regular. Pulmonary exam without rales rhonchi or wheezes. Abdomen without focal tenderness or withdrawal, no organomegaly. Periphery without edema. Gangrenous changes bilateral feet are not observed directly.    Impression and plan:   peripheral arterial disease with gangrene bilateral feet, remains afebrile, no indication of sepsis.   Persistent nausea and vomiting, no evidence of acute abdomen on exam, increase Zofran to 8 mg QID PRN, continue observation, patient has agreed to no further hospitalization in view of progressive gangrene and decline  for intervention. Patient has additionally declined hospice placement.   Paroxysmal atrial fibrillation, heart rate controlled.   Coronary artery disease without indication of angina.   Cerebral vascular disease without indication of focal  neurologic deficit.   End-stage renal disease continuing dialysis, per previous nursing report patient shortens dialysis times periodically, patient desires to continue dialysis at present.      Electronically signed by: Millie Farias MD

## 2021-06-25 NOTE — PROGRESS NOTES
[facility]    Facility:   [facility] [[id number]]   Code Status: POLST AVAILABLE      CHIEF COMPLAINT/REASON FOR VISIT:  Chief Complaint   Patient presents with     Problem Visit     nausea, INR       HISTORY:      HPI: Graciela is a 73 y.o. female patient with history of diabetes mellitus, hypertension, A. Fib, and end-stage renal disease on hemodialysis; became short of breath and had missed her dialysis appointment because of her foot pain and when she arrived at dialysis for her next appointment she became short of breath with hypoxemia and was transferred via EMS to [facility].  She was in A. fib with RVR but converted to sinus rhythm on her own.  Reportedly scheduled for vascular surgery as an outpatient in 2-4 weeks; she had been previously seen at [facility] 2 weeks prior for foot pain and was treated with antibiotics.    Today: INR 2.0; coumadin 0.5mg every other day and recheck Friday; Zofran is currently working on acute dementia at [place].  She reports eating and drinking well and is doing so right now I am in her room visiting.  She is alert and sitting up in bed and answering some questions with yes or no or nod of the head.    PVD: Possible below ankle amputation; - cancelled per family and patient wishes.    A. fib: On Coumadin.  Also on metoprolol for rate control.    End-stage renal disease: On hemodialysis Monday, Wednesday, Friday.  Does take midodrine with her to appointment to account for hypotension.  On 1500ml  fluid restriction.    DM 2: On sliding scale insulin, blood sugars have been well within normal limits in TCU.        Past Medical History:   Diagnosis Date     Acute hypoxemic respiratory failure (H)      CKD (chronic kidney disease) requiring chronic dialysis (H)      Critical lower limb ischemia      Diabetes mellitus, type II (H)      Essential hypertension      Hyperlipidemia      Influenza A 12/28/2016     PAD (peripheral artery disease) (H)       Paroxysmal atrial fibrillation (H) 12/28/2016     Pericardial cyst 2013     Sepsis (H)              Family History   Problem Relation Age of Onset     Acute Myocardial Infarction Neg Hx      Sudden death Neg Hx      Social History     Socioeconomic History     Marital status:      Spouse name: Not on file     Number of children: 9     Years of education: Not on file     Highest education level: Not on file   Occupational History     Not on file   Social Needs     Financial resource strain: Not on file     Food insecurity:     Worry: Not on file     Inability: Not on file     Transportation needs:     Medical: Not on file     Non-medical: Not on file   Tobacco Use     Smoking status: Never Smoker     Smokeless tobacco: Never Used   Substance and Sexual Activity     Alcohol use: No     Drug use: No     Sexual activity: No   Lifestyle     Physical activity:     Days per week: Not on file     Minutes per session: Not on file     Stress: Not on file   Relationships     Social connections:     Talks on phone: Not on file     Gets together: Not on file     Attends [employer] service: Not on file     Active member of club or organization: Not on file     Attends meetings of clubs or organizations: Not on file     Relationship status: Not on file     Intimate partner violence:     Fear of current or ex partner: Not on file     Emotionally abused: Not on file     Physically abused: Not on file     Forced sexual activity: Not on file   Other Topics Concern     Not on file   Social History Narrative     Not on file         Review of Systems   Constitutional: Positive for appetite change.   HENT: Negative.    Respiratory: Negative.    Cardiovascular: Negative.    Gastrointestinal: Positive for nausea. Negative for vomiting.   Genitourinary: Negative.    Musculoskeletal: Negative.         Vascular foot pain   Skin: Positive for color change.   Psychiatric/Behavioral: Negative.    per nursing.   Able to answer some yes or no  questions, per nursing main concern is painful bilateral lower extremities with darkened skin. Very painful.   .  Vitals:    03/20/19 1444   BP: 130/55   Pulse: 61   Temp: 98.4  F (36.9  C)   SpO2: 99%   Weight: 118 lb (53.5 kg)       Physical Exam   Constitutional: She is easily aroused.   Cardiovascular: Normal rate.   Pulmonary/Chest: Effort normal and breath sounds normal. No respiratory distress.   Abdominal: She exhibits no distension.   Musculoskeletal: She exhibits no edema.   Neurological: She is alert and easily aroused.   Skin: Skin is warm and dry.   Bottom of feet and toes black, tender, cool.          LABS:   To be completed at dialysis.     ASSESSMENT:      ICD-10-CM    1. PAD (peripheral artery disease) (H) I73.9    2. Paroxysmal atrial fibrillation (H) I48.0        PLAN:    INR 2.0, give 0.5mg every other day; recheck Friday.   Now on twice daily dosing for oxycodone for foot pain.   Nausea is improved over the weekend.  She has been eating a little bit from each meal and snacks the family brings.  She is currently sitting up beside the bed getting her lunch.  Reports that pain is under control when asked and pointed out her foot.  Reports that nausea is under control.      Electronically signed by: Julieta Stewart CNP